# Patient Record
Sex: FEMALE | Race: OTHER | Employment: OTHER | URBAN - METROPOLITAN AREA
[De-identification: names, ages, dates, MRNs, and addresses within clinical notes are randomized per-mention and may not be internally consistent; named-entity substitution may affect disease eponyms.]

---

## 2017-09-25 ENCOUNTER — HOSPITAL ENCOUNTER (OUTPATIENT)
Dept: GENERAL RADIOLOGY | Age: 72
Discharge: HOME | End: 2017-09-25
Attending: INTERNAL MEDICINE
Payer: MEDICARE

## 2017-09-25 DIAGNOSIS — M05.79 RHEUMATOID ARTHRITIS INVOLVING MULTIPLE SITES WITH POSITIVE RHEUMATOID FACTOR (HCC): ICD-10-CM

## 2017-09-25 PROCEDURE — 73030 X-RAY EXAM OF SHOULDER: CPT | Performed by: INTERNAL MEDICINE

## 2017-09-25 PROCEDURE — 73080 X-RAY EXAM OF ELBOW: CPT | Performed by: INTERNAL MEDICINE

## 2018-01-18 ENCOUNTER — HOSPITAL ENCOUNTER (INPATIENT)
Facility: HOSPITAL | Age: 73
LOS: 2 days | Discharge: HOME | DRG: 103 | End: 2018-01-20
Attending: EMERGENCY MEDICINE | Admitting: INTERNAL MEDICINE
Payer: MEDICARE

## 2018-01-18 ENCOUNTER — APPOINTMENT (OUTPATIENT)
Dept: CT IMAGING | Age: 73
DRG: 103 | End: 2018-01-18
Attending: EMERGENCY MEDICINE
Payer: MEDICARE

## 2018-01-18 DIAGNOSIS — R51.9 INTRACTABLE HEADACHE, UNSPECIFIED CHRONICITY PATTERN, UNSPECIFIED HEADACHE TYPE: Primary | ICD-10-CM

## 2018-01-18 LAB
ALBUMIN SERPL-MCNC: 4.2 G/DL (ref 3.5–4.8)
ALP LIVER SERPL-CCNC: 130 U/L (ref 55–142)
ALT SERPL-CCNC: 26 U/L (ref 14–54)
AST SERPL-CCNC: 34 U/L (ref 15–41)
BASOPHILS # BLD AUTO: 0.02 X10(3) UL (ref 0–0.1)
BASOPHILS NFR BLD AUTO: 0.2 %
BILIRUB SERPL-MCNC: 0.5 MG/DL (ref 0.1–2)
BILIRUB UR QL STRIP.AUTO: NEGATIVE
BUN BLD-MCNC: 12 MG/DL (ref 8–20)
CALCIUM BLD-MCNC: 8.8 MG/DL (ref 8.3–10.3)
CHLORIDE: 103 MMOL/L (ref 101–111)
CLARITY CSF: CLEAR
CLARITY CSF: CLEAR
CLARITY UR REFRACT.AUTO: CLEAR
CO2: 28 MMOL/L (ref 22–32)
COLOR CSF: COLORLESS
COLOR CSF: COLORLESS
COLOR UR AUTO: YELLOW
COUNT PERFORMED ON TUBE: 1
COUNT PERFORMED ON TUBE: 4
CREAT BLD-MCNC: 0.77 MG/DL (ref 0.55–1.02)
EOSINOPHIL # BLD AUTO: 0.08 X10(3) UL (ref 0–0.3)
EOSINOPHIL NFR BLD AUTO: 0.9 %
ERYTHROCYTE [DISTWIDTH] IN BLOOD BY AUTOMATED COUNT: 12.8 % (ref 11.5–16)
GLUCOSE BLD-MCNC: 117 MG/DL (ref 70–99)
GLUCOSE CSF: 57 MG/DL (ref 40–70)
GLUCOSE UR STRIP.AUTO-MCNC: NEGATIVE MG/DL
HCT VFR BLD AUTO: 40.7 % (ref 34–50)
HGB BLD-MCNC: 14 G/DL (ref 12–16)
IMMATURE GRANULOCYTE COUNT: 0.03 X10(3) UL (ref 0–1)
IMMATURE GRANULOCYTE RATIO %: 0.3 %
KETONES UR STRIP.AUTO-MCNC: NEGATIVE MG/DL
LEUKOCYTE ESTERASE UR QL STRIP.AUTO: NEGATIVE
LIPASE: 82 U/L (ref 73–393)
LYMPHOCYTES # BLD AUTO: 1.56 X10(3) UL (ref 0.9–4)
LYMPHOCYTES NFR BLD AUTO: 17.4 %
M PROTEIN MFR SERPL ELPH: 7.8 G/DL (ref 6.1–8.3)
MCH RBC QN AUTO: 31 PG (ref 27–33.2)
MCHC RBC AUTO-ENTMCNC: 34.4 G/DL (ref 31–37)
MCV RBC AUTO: 90.2 FL (ref 81–100)
MONOCYTES # BLD AUTO: 0.3 X10(3) UL (ref 0.1–0.6)
MONOCYTES NFR BLD AUTO: 3.3 %
NEUTROPHIL ABS PRELIM: 7 X10 (3) UL (ref 1.3–6.7)
NEUTROPHILS # BLD AUTO: 7 X10(3) UL (ref 1.3–6.7)
NEUTROPHILS NFR BLD AUTO: 77.9 %
NITRITE UR QL STRIP.AUTO: NEGATIVE
PH UR STRIP.AUTO: 8.5 [PH] (ref 4.5–8)
PLATELET # BLD AUTO: 160 10(3)UL (ref 150–450)
POTASSIUM SERPL-SCNC: 4 MMOL/L (ref 3.6–5.1)
RBC # BLD AUTO: 4.51 X10(6)UL (ref 3.8–5.1)
RBC #/AREA URNS AUTO: >10 /HPF
RBC CSF TUBE 1: 43 /MM3
RBC CSF: 0 /MM3
RED CELL DISTRIBUTION WIDTH-SD: 42.6 FL (ref 35.1–46.3)
SODIUM SERPL-SCNC: 137 MMOL/L (ref 136–144)
SP GR UR STRIP.AUTO: 1.02 (ref 1–1.03)
TOTAL PROTEIN CSF: 30.8 MG/DL (ref 15–45)
TOTAL VOLUME CSF: 8.5 ML
UROBILINOGEN UR STRIP.AUTO-MCNC: 0.2 MG/DL
WBC # BLD AUTO: 9 X10(3) UL (ref 4–13)
WBC # FLD MANUAL: 0 /MM3 (ref 0–5)

## 2018-01-18 PROCEDURE — 70450 CT HEAD/BRAIN W/O DYE: CPT | Performed by: EMERGENCY MEDICINE

## 2018-01-18 PROCEDURE — 99223 1ST HOSP IP/OBS HIGH 75: CPT | Performed by: HOSPITALIST

## 2018-01-18 PROCEDURE — 70496 CT ANGIOGRAPHY HEAD: CPT | Performed by: EMERGENCY MEDICINE

## 2018-01-18 PROCEDURE — 009U3ZX DRAINAGE OF SPINAL CANAL, PERCUTANEOUS APPROACH, DIAGNOSTIC: ICD-10-PCS | Performed by: EMERGENCY MEDICINE

## 2018-01-18 RX ORDER — DIPHENHYDRAMINE HYDROCHLORIDE 50 MG/ML
25 INJECTION INTRAMUSCULAR; INTRAVENOUS EVERY 8 HOURS PRN
Status: DISCONTINUED | OUTPATIENT
Start: 2018-01-18 | End: 2018-01-20

## 2018-01-18 RX ORDER — ONDANSETRON 2 MG/ML
4 INJECTION INTRAMUSCULAR; INTRAVENOUS EVERY 6 HOURS PRN
Status: DISCONTINUED | OUTPATIENT
Start: 2018-01-18 | End: 2018-01-20

## 2018-01-18 RX ORDER — HYDROCODONE BITARTRATE AND ACETAMINOPHEN 5; 325 MG/1; MG/1
1 TABLET ORAL EVERY 4 HOURS PRN
Status: DISCONTINUED | OUTPATIENT
Start: 2018-01-18 | End: 2018-01-20

## 2018-01-18 RX ORDER — ONDANSETRON 2 MG/ML
INJECTION INTRAMUSCULAR; INTRAVENOUS
Status: COMPLETED
Start: 2018-01-18 | End: 2018-01-18

## 2018-01-18 RX ORDER — ALPRAZOLAM 0.5 MG/1
0.5 TABLET ORAL NIGHTLY
COMMUNITY

## 2018-01-18 RX ORDER — SODIUM CHLORIDE 9 MG/ML
INJECTION, SOLUTION INTRAVENOUS CONTINUOUS
Status: DISCONTINUED | OUTPATIENT
Start: 2018-01-18 | End: 2018-01-20

## 2018-01-18 RX ORDER — HYDROMORPHONE HYDROCHLORIDE 1 MG/ML
1 INJECTION, SOLUTION INTRAMUSCULAR; INTRAVENOUS; SUBCUTANEOUS EVERY 2 HOUR PRN
Status: DISCONTINUED | OUTPATIENT
Start: 2018-01-18 | End: 2018-01-20

## 2018-01-18 RX ORDER — ENOXAPARIN SODIUM 100 MG/ML
40 INJECTION SUBCUTANEOUS DAILY
Status: DISCONTINUED | OUTPATIENT
Start: 2018-01-19 | End: 2018-01-20

## 2018-01-18 RX ORDER — ACETAMINOPHEN 325 MG/1
650 TABLET ORAL EVERY 4 HOURS PRN
Status: DISCONTINUED | OUTPATIENT
Start: 2018-01-18 | End: 2018-01-20

## 2018-01-18 RX ORDER — LEVOTHYROXINE SODIUM 0.12 MG/1
125 TABLET ORAL
Status: DISCONTINUED | OUTPATIENT
Start: 2018-01-19 | End: 2018-01-20

## 2018-01-18 RX ORDER — SODIUM CHLORIDE 9 MG/ML
INJECTION, SOLUTION INTRAVENOUS ONCE
Status: COMPLETED | OUTPATIENT
Start: 2018-01-18 | End: 2018-01-18

## 2018-01-18 RX ORDER — ONDANSETRON 2 MG/ML
4 INJECTION INTRAMUSCULAR; INTRAVENOUS ONCE
Status: COMPLETED | OUTPATIENT
Start: 2018-01-18 | End: 2018-01-18

## 2018-01-18 RX ORDER — PANTOPRAZOLE SODIUM 20 MG/1
20 TABLET, DELAYED RELEASE ORAL
Status: DISCONTINUED | OUTPATIENT
Start: 2018-01-19 | End: 2018-01-20

## 2018-01-18 RX ORDER — HYDROMORPHONE HYDROCHLORIDE 1 MG/ML
1 INJECTION, SOLUTION INTRAMUSCULAR; INTRAVENOUS; SUBCUTANEOUS EVERY 30 MIN PRN
Status: DISCONTINUED | OUTPATIENT
Start: 2018-01-18 | End: 2018-01-18

## 2018-01-18 RX ORDER — HYDROCODONE BITARTRATE AND ACETAMINOPHEN 5; 325 MG/1; MG/1
2 TABLET ORAL EVERY 4 HOURS PRN
Status: DISCONTINUED | OUTPATIENT
Start: 2018-01-18 | End: 2018-01-20

## 2018-01-19 PROCEDURE — 99232 SBSQ HOSP IP/OBS MODERATE 35: CPT | Performed by: HOSPITALIST

## 2018-01-19 PROCEDURE — 99223 1ST HOSP IP/OBS HIGH 75: CPT | Performed by: OTHER

## 2018-01-19 RX ORDER — DEXAMETHASONE SODIUM PHOSPHATE 4 MG/ML
4 VIAL (ML) INJECTION EVERY 6 HOURS
Status: COMPLETED | OUTPATIENT
Start: 2018-01-19 | End: 2018-01-19

## 2018-01-19 RX ORDER — ALPRAZOLAM 0.5 MG/1
0.5 TABLET ORAL NIGHTLY
Status: DISCONTINUED | OUTPATIENT
Start: 2018-01-19 | End: 2018-01-20

## 2018-01-19 RX ORDER — LORAZEPAM 2 MG/ML
INJECTION INTRAMUSCULAR
Status: COMPLETED
Start: 2018-01-19 | End: 2018-01-19

## 2018-01-19 RX ORDER — LORAZEPAM 2 MG/ML
2 INJECTION INTRAMUSCULAR ONCE
Status: COMPLETED | OUTPATIENT
Start: 2018-01-19 | End: 2018-01-19

## 2018-01-19 RX ORDER — CALCIUM CARBONATE 200(500)MG
500 TABLET,CHEWABLE ORAL 3 TIMES DAILY PRN
Status: DISCONTINUED | OUTPATIENT
Start: 2018-01-19 | End: 2018-01-20

## 2018-01-19 RX ORDER — DEXAMETHASONE SODIUM PHOSPHATE 4 MG/ML
4 VIAL (ML) INJECTION EVERY 6 HOURS
Status: DISCONTINUED | OUTPATIENT
Start: 2018-01-19 | End: 2018-01-20

## 2018-01-19 RX ORDER — DEXAMETHASONE SODIUM PHOSPHATE 4 MG/ML
4 VIAL (ML) INJECTION EVERY 8 HOURS
Status: DISCONTINUED | OUTPATIENT
Start: 2018-01-20 | End: 2018-01-20

## 2018-01-19 RX ORDER — ONDANSETRON 2 MG/ML
4 INJECTION INTRAMUSCULAR; INTRAVENOUS EVERY 8 HOURS
Status: DISCONTINUED | OUTPATIENT
Start: 2018-01-20 | End: 2018-01-20

## 2018-01-19 RX ORDER — ONDANSETRON 2 MG/ML
4 INJECTION INTRAMUSCULAR; INTRAVENOUS ONCE
Status: COMPLETED | OUTPATIENT
Start: 2018-01-19 | End: 2018-01-19

## 2018-01-19 NOTE — CM/SW NOTE
.     01/19/18 1400   CM/SW Screening   Referral [facility] staff; Chart review;Nursing rounds   Patient's Mental Status Alert;Oriented   Patient lives with (Daughter)   Patient Status Prior to Admission   Independent with

## 2018-01-19 NOTE — ED NOTES
Pt vomited during triage. States nausea is better at the moment. Does not want antiemetic. Told PT the order was standing and to alert nurses station if nausea returns so medication can be given. PT and daughter verbalized understanding of instructions.

## 2018-01-19 NOTE — PLAN OF CARE
Patient/Family Goals    • Patient/Family Long Term Goal Progressing    • Patient/Family Short Term Goal Progressing          Problem: Intractable headache    Data: Pt alert and oriented x4.  C/o headache/nausea-prn iv benadryl/zofran/dilaudid given with rel

## 2018-01-19 NOTE — PROGRESS NOTES
[facility]OSPITAL[facility]  Progress Note     Miles Raw Patient Status:  Inpatient    1945 MRN [id number]   [facility] 5NW-A Attending Aldair Buck MD   Hosp Day # 1 PCP Michelle Durand MD     Chief Complaint: headache    S: Patient feels patient  3. Cont pain control-much better today  4. Neurology  eval pending d/c  2. Hypothyroid, Synthroid  3.  RA-stable hands deformities        Quality:  · DVT Prophylaxis: lovenox  · CODE status: Full  · Kline: no     Plan of care discussed with patient

## 2018-01-19 NOTE — PROGRESS NOTES
NURSING ADMISSION NOTE      Patient admitted via Ambulance  Oriented to room. Safety precautions initiated. Bed in low position. Call light in reach.     Navigator completed  Dr. Ap Gross paged for IV pain meds since pt is nauseous/vomiting  Dr. Kat Morris

## 2018-01-19 NOTE — CONSULTS
[facility]  Report of Consultation    Mounika Murrell Patient Status:  Inpatient    1945 MRN [id number]   [facility] 5NW-A Attending Barak Connors MD   Hosp Day # 1 PCP Sugar Doe MD     Reason for Consultation:  Intractable hea HISTORY      Comment: Knuckle replacement  1996: OTHER SURGICAL HISTORY      Comment: Cervical fusion C-1, C-2  Family History   Problem Relation Age of Onset   • Heart Disease Mother    • Hypertension Mother    • Heart Disease Father    • Hypertension Fat **OR** HYDROcodone-acetaminophen (NORCO) 5-325 MG per tab 2 tablet, 2 tablet, Oral, Q4H PRN  •  ondansetron HCl (ZOFRAN) injection 4 mg, 4 mg, Intravenous, Q6H PRN  •  HYDROmorphone HCl PF (DILAUDID) 1 MG/ML injection 1 mg, 1 mg, Intravenous, Q2H PRN  •  V  01/18/2018   K 4.0 01/18/2018    01/18/2018   CO2 28.0 01/18/2018    01/18/2018   CA 8.8 01/18/2018   ALB 4.2 01/18/2018   ALKPHO 130 01/18/2018   BILT 0.5 01/18/2018   TP 7.8 01/18/2018   AST 34 01/18/2018   ALT 26 01/18/2018   LIP 8

## 2018-01-19 NOTE — ED NOTES
Pt transferred to EMS stretcher, remains flat. Pt remains stable. Comfortable with admission plan of care.

## 2018-01-19 NOTE — ED PROVIDER NOTES
Patient Seen in: THE [facility] Emergency Department In [place]    History   Patient presents with:  Nausea/Vomiting/Diarrhea (gastrointestinal)  Headache (neurologic)    Stated Complaint: headache and vomiting since this am    HPI    Patient is a 77-year-old systems are as noted in HPI. Constitutional and vital signs reviewed. All other systems reviewed and negative except as noted above.     Physical Exam   ED Triage Vitals [01/18/18 1834]  BP: 153/93  Pulse: 96  Resp: 20  Temp: 98.1 °F (36.7 °C)  Temp s following:     RBC URINE >10 (*)     Bacteria Urine Rare (*)     All other components within normal limits   CBC W/ DIFFERENTIAL - Abnormal; Notable for the following:     Neutrophil Absolute Prelim 7.00 (*)     Neutrophil Absolute 7.00 (*)     All other c FINDINGS:  Again noted is a large metallic plate involving the right parietal bone.   This causes extensive metallic artifact which obscures large portions of the right cerebrum, right basal ganglia, upper mid brain and lesser portions of the left cerebrum clear CSF was removed from the subarachnoid space and the L4-L5 space with one attempt, hemostasis was achieved and she was placed in the supine position for 1 hour given IV fluids.       After the LP, the CTA of her brain was obtained, and she was transfer

## 2018-01-19 NOTE — ED INITIAL ASSESSMENT (HPI)
Pt to the ED for evaluation of headache and nausea that started this morning. PT reports she had one episode of emesis today at 1530 and has been nauseated ever since.

## 2018-01-19 NOTE — H&P
[facility] H[facility]PITAL[facility]  History and Physical     Idalia Gil Patient Status:  Inpatient    1945 MRN [id number]   [facility] 5NW-A Attending Michelle Christopher MD   Hosp Day # 0 PCP Tulio Lainez MD     Chief Complaint: Headache    His Family History   Problem Relation Age of Onset   • Heart Disease Mother    • Hypertension Mother    • Heart Disease Father    • Hypertension Father    • Cancer Brother      Throat Cancer   • Cancer Brother      Esophageal Cancer   • Diabetes Brother    • edema or cyanosis. Integument: No rashes or lesions. Psychiatric: Appropriate mood and affect.       Diagnostic Data:      Labs:  Recent Labs   Lab  01/18/18   1836   WBC  9.0   HGB  14.0   MCV  90.2   PLT  160.0       Recent Labs   Lab  01/18/18   1836

## 2018-01-20 VITALS
BODY MASS INDEX: 27.88 KG/M2 | OXYGEN SATURATION: 92 % | SYSTOLIC BLOOD PRESSURE: 149 MMHG | TEMPERATURE: 98 F | HEIGHT: 60 IN | HEART RATE: 64 BPM | WEIGHT: 142 LBS | RESPIRATION RATE: 20 BRPM | DIASTOLIC BLOOD PRESSURE: 60 MMHG

## 2018-01-20 PROCEDURE — 99233 SBSQ HOSP IP/OBS HIGH 50: CPT | Performed by: OTHER

## 2018-01-20 PROCEDURE — 99238 HOSP IP/OBS DSCHRG MGMT 30/<: CPT | Performed by: HOSPITALIST

## 2018-01-20 RX ORDER — METHYLPREDNISOLONE 4 MG/1
TABLET ORAL
Qty: 1 PACKAGE | Refills: 0 | Status: SHIPPED | OUTPATIENT
Start: 2018-01-20 | End: 2020-01-24

## 2018-01-20 RX ORDER — SUMATRIPTAN 50 MG/1
50 TABLET, FILM COATED ORAL EVERY 2 HOUR PRN
Qty: 9 TABLET | Refills: 0 | Status: SHIPPED | OUTPATIENT
Start: 2018-01-20 | End: 2018-02-07

## 2018-01-20 NOTE — PLAN OF CARE
Patient/Family Goals    • Patient/Family Long Term Goal Progressing    • Patient/Family Short Term Goal Progressing        Pt is alert and oriented. O2 98% on Ra. Pt C/o pain in neck at 6/10. Pt states nausea has gotten better.   Administering meds per M

## 2018-01-20 NOTE — PROGRESS NOTES
[id number] [facility] Progress Note    Kevin Andrade Patient Status:  Inpatient    1945 MRN [id number]   [facility] 5NW-A Attending Arlene Holt MD   Hosp Day # 2 PCP Sally Amaro MD     Subjective:  Kevin Andrade is a(n) 7 migraine  - in addition, will send home on medrol dose pack     Can follow up in clinic with me in ~ 4 weeks for migraine management long term       Enedina Barragan MD, Neurology  [facility]  Pager [phone]  1/20/2018

## 2018-01-20 NOTE — PROGRESS NOTES
PROBLEM: Headache    ASSESSMENT: Assumed care of pt at 1500. Pt is A&O x4. VSS, afebrile. Denies any dizziness or photosensitivity; states that headache has improved overall. Scheduled medications given per neurology order. On RA, denies SOB.  Eating dinner

## 2018-01-25 NOTE — DISCHARGE SUMMARY
[facility]OSPITAL[facility]  DISCHARGE SUMMARY     Ivelisse Monge Patient Status:  Inpatient    1945 MRN [id number]   [facility] 5NW-A Attending No att. providers found   Hosp Day # 2 PCP Meghana Brady MD     Date of Admission: 2018  Date taking on:  2/19/2018  Quantity:  9 tablet  Refills:  0        CONTINUE taking these medications      Instructions Prescription details   ACTEMRA SC      Inject into the skin. Refills:  0     ALPRAZolam 0.5 MG Tabs  Commonly known as:  XANAX      Take 0.

## 2018-02-07 RX ORDER — SUMATRIPTAN 50 MG/1
TABLET, FILM COATED ORAL
Qty: 9 TABLET | Refills: 0 | Status: SHIPPED | OUTPATIENT
Start: 2018-02-07

## 2018-02-07 NOTE — TELEPHONE ENCOUNTER
Medication:Sumatriptan 50 mg    Date of last refill: 01/20/18  [place]     Last office visit: 1/20/17(Hospital)  Due back to clinic per last office note:  RTN in 4 weeks  Date next office visit scheduled:  No future appo

## 2018-10-27 ENCOUNTER — HOSPITAL ENCOUNTER (EMERGENCY)
Age: 73
Discharge: HOME | End: 2018-10-28
Attending: EMERGENCY MEDICINE
Payer: MEDICARE

## 2018-10-27 DIAGNOSIS — R31.9 HEMATURIA, UNSPECIFIED TYPE: ICD-10-CM

## 2018-10-27 DIAGNOSIS — D69.6 THROMBOCYTOPENIA (HCC): ICD-10-CM

## 2018-10-27 DIAGNOSIS — R53.81 MALAISE: Primary | ICD-10-CM

## 2018-10-27 PROCEDURE — 99284 EMERGENCY DEPT VISIT MOD MDM: CPT

## 2018-10-27 PROCEDURE — 85025 COMPLETE CBC W/AUTO DIFF WBC: CPT | Performed by: EMERGENCY MEDICINE

## 2018-10-27 PROCEDURE — 36415 COLL VENOUS BLD VENIPUNCTURE: CPT

## 2018-10-27 PROCEDURE — 99285 EMERGENCY DEPT VISIT HI MDM: CPT

## 2018-10-27 PROCEDURE — 80053 COMPREHEN METABOLIC PANEL: CPT | Performed by: EMERGENCY MEDICINE

## 2018-10-27 PROCEDURE — 93005 ELECTROCARDIOGRAM TRACING: CPT

## 2018-10-27 PROCEDURE — 84484 ASSAY OF TROPONIN QUANT: CPT | Performed by: EMERGENCY MEDICINE

## 2018-10-27 PROCEDURE — 93010 ELECTROCARDIOGRAM REPORT: CPT

## 2018-10-28 VITALS
RESPIRATION RATE: 16 BRPM | WEIGHT: 140 LBS | OXYGEN SATURATION: 97 % | TEMPERATURE: 99 F | DIASTOLIC BLOOD PRESSURE: 58 MMHG | SYSTOLIC BLOOD PRESSURE: 168 MMHG | HEART RATE: 71 BPM | BODY MASS INDEX: 27 KG/M2

## 2018-10-28 PROCEDURE — 81001 URINALYSIS AUTO W/SCOPE: CPT | Performed by: EMERGENCY MEDICINE

## 2018-10-28 PROCEDURE — 87086 URINE CULTURE/COLONY COUNT: CPT | Performed by: EMERGENCY MEDICINE

## 2018-10-28 NOTE — ED PROVIDER NOTES
Patient Seen in: [facility] In [place]    History   Patient presents with:  Hypertension (cardiovascular)    Stated Complaint: htn, headache, nausea, fatigue    HPI    This is a very pleasant 15-year-old female with past medical histo Temp 98.5 °F (36.9 °C) (Temporal)   Resp 16   Wt 63.5 kg   SpO2 97%   BMI 27.34 kg/m²         Physical Exam    GENERAL: Awake, alert oriented x3, nontoxic appearing. SKIN: Normal, warm, and dry. HEENT:  Pupils equally round and reactive to light.  Conjuc Report. Rate:69  Rhythm: Sinus Rhythm  Reading: No acute changes                    MDM     Patient had a very difficult IV access. We are unable to obtain IV access and she declined any further attempts. Basic labs were obtained.   CBC: White blood cell

## 2018-10-28 NOTE — ED INITIAL ASSESSMENT (HPI)
Pt states she has not been feeling well all week with nausea, lightheadedness, fatigue and headache. Today pt checked BP at home and was very elevated with no hx of htn.

## 2020-01-24 ENCOUNTER — APPOINTMENT (OUTPATIENT)
Dept: ULTRASOUND IMAGING | Age: 75
End: 2020-01-24
Attending: EMERGENCY MEDICINE
Payer: MEDICARE

## 2020-01-24 ENCOUNTER — HOSPITAL ENCOUNTER (EMERGENCY)
Age: 75
Discharge: HOME | End: 2020-01-24
Attending: EMERGENCY MEDICINE
Payer: MEDICARE

## 2020-01-24 VITALS
OXYGEN SATURATION: 98 % | RESPIRATION RATE: 18 BRPM | DIASTOLIC BLOOD PRESSURE: 77 MMHG | BODY MASS INDEX: 27 KG/M2 | TEMPERATURE: 98 F | HEART RATE: 78 BPM | WEIGHT: 140 LBS | SYSTOLIC BLOOD PRESSURE: 180 MMHG

## 2020-01-24 DIAGNOSIS — M79.662 PAIN OF LEFT CALF: Primary | ICD-10-CM

## 2020-01-24 DIAGNOSIS — M77.8 RIGHT ELBOW TENDINITIS: ICD-10-CM

## 2020-01-24 PROCEDURE — 99284 EMERGENCY DEPT VISIT MOD MDM: CPT

## 2020-01-24 PROCEDURE — 93971 EXTREMITY STUDY: CPT | Performed by: EMERGENCY MEDICINE

## 2020-01-24 NOTE — ED PROVIDER NOTES
Patient Seen in: [facility] In [place]      History   Patient presents with:  Deep Vein Thrombosis  Lower Extremity Injury  Upper Extremity Injury    Stated Complaint: LEFT CALF PAIN SINCE LAST NOC.   SINCE 01/01/20 WITH RIGHT ELBOW PA use: No             Review of Systems    Positive for stated complaint: LEFT CALF PAIN SINCE LAST NOC. SINCE 01/01/20 WITH RIGHT ELBOW PAIN, HAS HAD X*  Other systems are as noted in HPI. Constitutional and vital signs reviewed.       All other systems re EXAMINED:  Left lower extremity SAPHENOFEMORAL JUNCTION:  No reflux. THROMBI:  None visible. COMPRESSION:  Normal compressibility, phasicity, and augmentation. OTHER:  Negative. CONCLUSION:  No evidence of left lower extremity DVT.     Dictated by: Chirag Cornell

## 2020-01-24 NOTE — ED INITIAL ASSESSMENT (HPI)
REPORTS LEFT CALF PAIN SINCE LAST NOC AND IS CONCERNED OF DVT. GOT SHOCKED WITH TIEN LIGHTS ON 01/01/20 AND HAS RIGHT ARM PAIN. HAS CONTINUED RIGHT ARM PAIN.   WAS INSTRUCTED TO GET THERAPY ON HER ARM BUT \"I DONT KNOW WHAT IS WRONG SO I AM NOT GOING

## 2020-01-26 ENCOUNTER — APPOINTMENT (OUTPATIENT)
Dept: GENERAL RADIOLOGY | Age: 75
End: 2020-01-26
Attending: EMERGENCY MEDICINE
Payer: MEDICARE

## 2020-01-26 ENCOUNTER — HOSPITAL ENCOUNTER (EMERGENCY)
Age: 75
Discharge: HOME | End: 2020-01-26
Attending: EMERGENCY MEDICINE
Payer: MEDICARE

## 2020-01-26 VITALS
HEART RATE: 71 BPM | BODY MASS INDEX: 27.48 KG/M2 | HEIGHT: 60 IN | OXYGEN SATURATION: 99 % | DIASTOLIC BLOOD PRESSURE: 68 MMHG | TEMPERATURE: 98 F | RESPIRATION RATE: 18 BRPM | SYSTOLIC BLOOD PRESSURE: 198 MMHG | WEIGHT: 140 LBS

## 2020-01-26 DIAGNOSIS — S20.212A CONTUSION OF LEFT CHEST WALL, INITIAL ENCOUNTER: Primary | ICD-10-CM

## 2020-01-26 PROCEDURE — 71101 X-RAY EXAM UNILAT RIBS/CHEST: CPT | Performed by: EMERGENCY MEDICINE

## 2020-01-26 PROCEDURE — 99283 EMERGENCY DEPT VISIT LOW MDM: CPT

## 2020-01-26 NOTE — ED PROVIDER NOTES
Patient Seen in: [facility] Emergency Department In [place]      History   Patient presents with:  Fall  Trauma    Stated Complaint: Pt fell Friday evening (sts she tripped on rug), c/o left rib pain.  Not on bloo*    HPI    73-year-old female presents ashley (36.7 °C)   Temp src Temporal   SpO2 99 %   O2 Device        Current:BP (!) 198/68   Pulse 71   Temp 98.1 °F (36.7 °C) (Temporal)   Resp 18   Ht 152.4 cm (5')   Wt 63.5 kg   SpO2 99%   BMI 27.34 kg/m²         Physical Exam    GENERAL: Patient is awake, felicitas Prescribed:  Current Discharge Medication List

## 2020-01-26 NOTE — ED INITIAL ASSESSMENT (HPI)
Pt sts she tripped and fell on run Friday evening. Sts she fell on her front onto a metal stool. C/O left sided rib pain and arm pain. Also c/o right toe pain. Was seen here Friday am to r/o DVT and d/c home. Denies head injury, not on any blood thinners.

## 2020-01-30 ENCOUNTER — HOSPITAL ENCOUNTER (EMERGENCY)
Age: 75
Discharge: HOME | End: 2020-01-30
Attending: EMERGENCY MEDICINE
Payer: MEDICARE

## 2020-01-30 ENCOUNTER — APPOINTMENT (OUTPATIENT)
Dept: GENERAL RADIOLOGY | Age: 75
End: 2020-01-30
Attending: NURSE PRACTITIONER
Payer: MEDICARE

## 2020-01-30 VITALS
DIASTOLIC BLOOD PRESSURE: 74 MMHG | HEART RATE: 84 BPM | WEIGHT: 140 LBS | TEMPERATURE: 99 F | BODY MASS INDEX: 27 KG/M2 | OXYGEN SATURATION: 98 % | SYSTOLIC BLOOD PRESSURE: 207 MMHG | RESPIRATION RATE: 20 BRPM

## 2020-01-30 DIAGNOSIS — S22.31XA CLOSED FRACTURE OF ONE RIB OF RIGHT SIDE, INITIAL ENCOUNTER: Primary | ICD-10-CM

## 2020-01-30 PROCEDURE — 71101 X-RAY EXAM UNILAT RIBS/CHEST: CPT | Performed by: NURSE PRACTITIONER

## 2020-01-30 PROCEDURE — 99283 EMERGENCY DEPT VISIT LOW MDM: CPT

## 2020-01-30 NOTE — ED INITIAL ASSESSMENT (HPI)
Pt states that she fell on Friday into a bar stool and was seen here for her injury. Pt states that last night she was rolling in her bed, and heard a \"pop\" in her RIGHT side of the chest. Pt states that she has pain with movement.

## 2020-01-31 NOTE — ED PROVIDER NOTES
Patient Seen in: THE [facility] Emergency Department In [place]      History   Patient presents with:  Trauma    Stated Complaint: right side chest pain:\"felt a pop\"    HPI  Patient is a 57-year-old female past medical history of osteoporosis, hypothyroidis ED Triage Vitals [01/30/20 0146]   BP (!) 201/62   Pulse 84   Resp 16   Temp 98.9 °F (37.2 °C)   Temp src Temporal   SpO2 98 %   O2 Device None (Room air)       Current:BP (!) 207/74   Pulse 84   Temp 98.9 °F (37.2 °C) (Temporal)   Resp 20   Wt 63.5 kg   S PROCEDURE:  US VENOUS DOPPLER LEG LEFT - DIAG IMG (CPT=93971)  COMPARISON:  None. INDICATIONS:  LEFT CALF PAIN SINCE LAST NOC.   SINCE 01/01/20 WITH RIGHT ELBOW PAIN, HAS HAD X-RAY ON IT BUT IT STILL HURTS  TECHNIQUE:  Real time, grey scale, and duplex ult PROCEDURE:  XR RIBS WITH CHEST (3 VIEWS), LEFT  (CPT=71101)  TECHNIQUE:  PA Chest and three views of the ribs were obtained  COMPARISON:  [facility], XR, CHEST PA   LATERAL, 2/02/2012, 14:03.   INDICATIONS:  Pt fell Friday evening (sts she tripped on rug), CONCLUSION:  No acute right-sided rib fractures. No acute pulmonary findings.     Dictated by: Dasia Day MD on 1/30/2020 at 18:41     Approved by: Dasia Day MD on 1/30/2020 at 18:42            MDM     Right rib x-ray/chest x-ray-no acute ri

## 2023-01-23 PROBLEM — H35.30 ARMD (AGE RELATED MACULAR DEGENERATION): Status: ACTIVE | Noted: 2022-09-06

## 2023-01-23 PROBLEM — H54.3 DECREASED VISION IN BOTH EYES: Status: ACTIVE | Noted: 2022-09-06

## 2023-01-23 PROBLEM — H40.1234 BILATERAL LOW-TENSION GLAUCOMA, INDETERMINATE STAGE: Status: ACTIVE | Noted: 2018-04-04

## 2023-01-23 PROBLEM — M81.8 OTHER OSTEOPOROSIS WITHOUT CURRENT PATHOLOGICAL FRACTURE: Status: ACTIVE | Noted: 2022-11-01

## 2023-02-18 ENCOUNTER — HOSPITAL ENCOUNTER (EMERGENCY)
Age: 78
Discharge: HOME | End: 2023-02-18
Attending: EMERGENCY MEDICINE
Payer: MEDICARE

## 2023-02-18 VITALS
HEART RATE: 85 BPM | HEIGHT: 60 IN | TEMPERATURE: 99 F | BODY MASS INDEX: 28.07 KG/M2 | DIASTOLIC BLOOD PRESSURE: 89 MMHG | OXYGEN SATURATION: 96 % | WEIGHT: 143 LBS | RESPIRATION RATE: 17 BRPM | SYSTOLIC BLOOD PRESSURE: 190 MMHG

## 2023-02-18 DIAGNOSIS — U07.1 COVID-19: Primary | ICD-10-CM

## 2023-02-18 LAB — SARS-COV-2 RNA RESP QL NAA+PROBE: DETECTED

## 2023-02-18 PROCEDURE — 99283 EMERGENCY DEPT VISIT LOW MDM: CPT

## 2023-02-18 PROCEDURE — 99284 EMERGENCY DEPT VISIT MOD MDM: CPT

## 2023-02-18 RX ORDER — NIRMATRELVIR AND RITONAVIR 300-100 MG
KIT ORAL
Qty: 30 TABLET | Refills: 0 | Status: SHIPPED | OUTPATIENT
Start: 2023-02-18 | End: 2023-02-23

## 2023-02-18 RX ORDER — ONDANSETRON 4 MG/1
4 TABLET, ORALLY DISINTEGRATING ORAL EVERY 4 HOURS PRN
Qty: 10 TABLET | Refills: 0 | Status: SHIPPED | OUTPATIENT
Start: 2023-02-18 | End: 2023-02-25

## 2023-02-18 NOTE — ED INITIAL ASSESSMENT (HPI)
Pt states her daughter is covid positive and she now has the same symptoms. Pt reports cough and sore throat. Pt denies any fevers.

## 2023-06-23 ENCOUNTER — HOSPITAL ENCOUNTER (OUTPATIENT)
Dept: CT IMAGING | Age: 78
Discharge: HOME | End: 2023-06-23
Attending: FAMILY MEDICINE
Payer: MEDICARE

## 2023-06-23 DIAGNOSIS — R14.0 BLOATING: ICD-10-CM

## 2023-06-23 DIAGNOSIS — Z80.0 FAMILY HISTORY OF MALIGNANT NEOPLASM OF GASTROINTESTINAL TRACT: ICD-10-CM

## 2023-06-23 DIAGNOSIS — K21.00 GASTROESOPHAGEAL REFLUX DISEASE WITH ESOPHAGITIS WITHOUT HEMORRHAGE: ICD-10-CM

## 2023-06-23 DIAGNOSIS — R10.32 LEFT LOWER QUADRANT ABDOMINAL PAIN: ICD-10-CM

## 2023-06-23 PROCEDURE — 74177 CT ABD & PELVIS W/CONTRAST: CPT | Performed by: FAMILY MEDICINE

## 2023-07-10 ENCOUNTER — HOSPITAL ENCOUNTER (EMERGENCY)
Age: 78
Discharge: HOME | End: 2023-07-10
Attending: EMERGENCY MEDICINE
Payer: MEDICARE

## 2023-07-10 ENCOUNTER — APPOINTMENT (OUTPATIENT)
Dept: GENERAL RADIOLOGY | Age: 78
End: 2023-07-10
Attending: EMERGENCY MEDICINE
Payer: MEDICARE

## 2023-07-10 VITALS
WEIGHT: 140 LBS | OXYGEN SATURATION: 97 % | BODY MASS INDEX: 27.48 KG/M2 | RESPIRATION RATE: 16 BRPM | TEMPERATURE: 98 F | HEIGHT: 60 IN | SYSTOLIC BLOOD PRESSURE: 169 MMHG | HEART RATE: 67 BPM | DIASTOLIC BLOOD PRESSURE: 60 MMHG

## 2023-07-10 DIAGNOSIS — I49.1 PAC (PREMATURE ATRIAL CONTRACTION): ICD-10-CM

## 2023-07-10 DIAGNOSIS — R00.2 PALPITATIONS: Primary | ICD-10-CM

## 2023-07-10 LAB
ALBUMIN SERPL-MCNC: 4.1 G/DL (ref 3.4–5)
ALBUMIN/GLOB SERPL: 1.2 {RATIO} (ref 1–2)
ALP LIVER SERPL-CCNC: 119 U/L
ALT SERPL-CCNC: 33 U/L
ANION GAP SERPL CALC-SCNC: 2 MMOL/L (ref 0–18)
AST SERPL-CCNC: 29 U/L (ref 15–37)
ATRIAL RATE: 66 BPM
ATRIAL RATE: 81 BPM
BASOPHILS # BLD AUTO: 0.03 X10(3) UL (ref 0–0.2)
BASOPHILS NFR BLD AUTO: 0.4 %
BILIRUB SERPL-MCNC: 0.4 MG/DL (ref 0.1–2)
BUN BLD-MCNC: 16 MG/DL (ref 7–18)
CALCIUM BLD-MCNC: 9.1 MG/DL (ref 8.5–10.1)
CHLORIDE SERPL-SCNC: 106 MMOL/L (ref 98–112)
CO2 SERPL-SCNC: 29 MMOL/L (ref 21–32)
CREAT BLD-MCNC: 1.08 MG/DL
EOSINOPHIL # BLD AUTO: 0.23 X10(3) UL (ref 0–0.7)
EOSINOPHIL NFR BLD AUTO: 3.2 %
ERYTHROCYTE [DISTWIDTH] IN BLOOD BY AUTOMATED COUNT: 13.4 %
GFR SERPLBLD BASED ON 1.73 SQ M-ARVRAT: 52 ML/MIN/1.73M2 (ref 60–?)
GLOBULIN PLAS-MCNC: 3.5 G/DL (ref 2.8–4.4)
GLUCOSE BLD-MCNC: 103 MG/DL (ref 70–99)
HCT VFR BLD AUTO: 43.5 %
HGB BLD-MCNC: 14.3 G/DL
IMM GRANULOCYTES # BLD AUTO: 0.02 X10(3) UL (ref 0–1)
IMM GRANULOCYTES NFR BLD: 0.3 %
LYMPHOCYTES # BLD AUTO: 2.59 X10(3) UL (ref 1–4)
LYMPHOCYTES NFR BLD AUTO: 36.3 %
MAGNESIUM SERPL-MCNC: 2.1 MG/DL (ref 1.6–2.6)
MCH RBC QN AUTO: 30.5 PG (ref 26–34)
MCHC RBC AUTO-ENTMCNC: 32.9 G/DL (ref 31–37)
MCV RBC AUTO: 92.8 FL
MONOCYTES # BLD AUTO: 0.45 X10(3) UL (ref 0.1–1)
MONOCYTES NFR BLD AUTO: 6.3 %
NEUTROPHILS # BLD AUTO: 3.81 X10 (3) UL (ref 1.5–7.7)
NEUTROPHILS # BLD AUTO: 3.81 X10(3) UL (ref 1.5–7.7)
NEUTROPHILS NFR BLD AUTO: 53.5 %
OSMOLALITY SERPL CALC.SUM OF ELEC: 285 MOSM/KG (ref 275–295)
P AXIS: 44 DEGREES
P AXIS: 58 DEGREES
P-R INTERVAL: 214 MS
P-R INTERVAL: 222 MS
PLATELET # BLD AUTO: 149 10(3)UL (ref 150–450)
POTASSIUM SERPL-SCNC: 4 MMOL/L (ref 3.5–5.1)
PROT SERPL-MCNC: 7.6 G/DL (ref 6.4–8.2)
Q-T INTERVAL: 348 MS
Q-T INTERVAL: 386 MS
QRS DURATION: 76 MS
QRS DURATION: 80 MS
QTC CALCULATION (BEZET): 404 MS
QTC CALCULATION (BEZET): 404 MS
R AXIS: 2 DEGREES
R AXIS: 7 DEGREES
RBC # BLD AUTO: 4.69 X10(6)UL
SODIUM SERPL-SCNC: 137 MMOL/L (ref 136–145)
T AXIS: 37 DEGREES
T AXIS: 69 DEGREES
TROPONIN I HIGH SENSITIVITY: 7 NG/L
TROPONIN I HIGH SENSITIVITY: 9 NG/L
VENTRICULAR RATE: 66 BPM
VENTRICULAR RATE: 81 BPM
WBC # BLD AUTO: 7.1 X10(3) UL (ref 4–11)

## 2023-07-10 PROCEDURE — 80053 COMPREHEN METABOLIC PANEL: CPT

## 2023-07-10 PROCEDURE — 85025 COMPLETE CBC W/AUTO DIFF WBC: CPT | Performed by: EMERGENCY MEDICINE

## 2023-07-10 PROCEDURE — 99284 EMERGENCY DEPT VISIT MOD MDM: CPT

## 2023-07-10 PROCEDURE — 93010 ELECTROCARDIOGRAM REPORT: CPT

## 2023-07-10 PROCEDURE — 36415 COLL VENOUS BLD VENIPUNCTURE: CPT

## 2023-07-10 PROCEDURE — 83735 ASSAY OF MAGNESIUM: CPT | Performed by: EMERGENCY MEDICINE

## 2023-07-10 PROCEDURE — 84484 ASSAY OF TROPONIN QUANT: CPT | Performed by: EMERGENCY MEDICINE

## 2023-07-10 PROCEDURE — 93005 ELECTROCARDIOGRAM TRACING: CPT

## 2023-07-10 PROCEDURE — 99285 EMERGENCY DEPT VISIT HI MDM: CPT

## 2023-07-10 PROCEDURE — 71045 X-RAY EXAM CHEST 1 VIEW: CPT | Performed by: EMERGENCY MEDICINE

## 2023-07-10 PROCEDURE — 80053 COMPREHEN METABOLIC PANEL: CPT | Performed by: EMERGENCY MEDICINE

## 2023-07-10 PROCEDURE — 84484 ASSAY OF TROPONIN QUANT: CPT

## 2023-07-10 PROCEDURE — 85025 COMPLETE CBC W/AUTO DIFF WBC: CPT

## 2023-07-10 NOTE — ED INITIAL ASSESSMENT (HPI)
PT STATES SHE WAS WATCHING TV APPROX 1 HR PTA AND STARTED FEELING A FLUTTERING IN HER CHEST. C/o TIGHTNESS TO CHEST. dENIES SOB, N/V, LIGHTHEADED OR DIZZINESS.

## 2023-08-14 ENCOUNTER — HOSPITAL ENCOUNTER (EMERGENCY)
Age: 78
Discharge: LEFT BEFORE DISCHARGE | End: 2023-08-14
Payer: MEDICARE

## 2023-08-15 ENCOUNTER — HOSPITAL ENCOUNTER (EMERGENCY)
Age: 78
Discharge: HOME | End: 2023-08-15
Attending: EMERGENCY MEDICINE
Payer: MEDICARE

## 2023-08-15 VITALS
RESPIRATION RATE: 16 BRPM | DIASTOLIC BLOOD PRESSURE: 86 MMHG | HEIGHT: 60 IN | BODY MASS INDEX: 27.48 KG/M2 | TEMPERATURE: 98 F | WEIGHT: 140 LBS | SYSTOLIC BLOOD PRESSURE: 175 MMHG | OXYGEN SATURATION: 96 % | HEART RATE: 75 BPM

## 2023-08-15 DIAGNOSIS — J06.9 UPPER RESPIRATORY TRACT INFECTION, UNSPECIFIED TYPE: Primary | ICD-10-CM

## 2023-08-15 LAB — SARS-COV-2 RNA RESP QL NAA+PROBE: NOT DETECTED

## 2023-08-15 PROCEDURE — 99283 EMERGENCY DEPT VISIT LOW MDM: CPT

## 2023-08-15 PROCEDURE — 99282 EMERGENCY DEPT VISIT SF MDM: CPT

## 2023-09-05 ENCOUNTER — ANESTHESIA EVENT (OUTPATIENT)
Dept: ENDOSCOPY | Facility: HOSPITAL | Age: 78
End: 2023-09-05
Payer: MEDICARE

## 2023-09-06 ENCOUNTER — HOSPITAL ENCOUNTER (OUTPATIENT)
Facility: HOSPITAL | Age: 78
Setting detail: HOSPITAL OUTPATIENT SURGERY
Discharge: HOME | End: 2023-09-06
Attending: INTERNAL MEDICINE | Admitting: INTERNAL MEDICINE
Payer: MEDICARE

## 2023-09-06 ENCOUNTER — ANESTHESIA (OUTPATIENT)
Dept: ENDOSCOPY | Facility: HOSPITAL | Age: 78
End: 2023-09-06
Payer: MEDICARE

## 2023-09-06 VITALS
SYSTOLIC BLOOD PRESSURE: 169 MMHG | TEMPERATURE: 98 F | RESPIRATION RATE: 16 BRPM | HEIGHT: 60 IN | OXYGEN SATURATION: 92 % | WEIGHT: 140 LBS | HEART RATE: 72 BPM | DIASTOLIC BLOOD PRESSURE: 66 MMHG | BODY MASS INDEX: 27.48 KG/M2

## 2023-09-06 DIAGNOSIS — R14.0 BLOATING: ICD-10-CM

## 2023-09-06 DIAGNOSIS — K21.9 GASTROESOPHAGEAL REFLUX DISEASE, UNSPECIFIED WHETHER ESOPHAGITIS PRESENT: ICD-10-CM

## 2023-09-06 PROCEDURE — 88305 TISSUE EXAM BY PATHOLOGIST: CPT | Performed by: INTERNAL MEDICINE

## 2023-09-06 PROCEDURE — 0DB98ZX EXCISION OF DUODENUM, VIA NATURAL OR ARTIFICIAL OPENING ENDOSCOPIC, DIAGNOSTIC: ICD-10-PCS | Performed by: INTERNAL MEDICINE

## 2023-09-06 PROCEDURE — 0DB58ZX EXCISION OF ESOPHAGUS, VIA NATURAL OR ARTIFICIAL OPENING ENDOSCOPIC, DIAGNOSTIC: ICD-10-PCS | Performed by: INTERNAL MEDICINE

## 2023-09-06 PROCEDURE — 0DB78ZX EXCISION OF STOMACH, PYLORUS, VIA NATURAL OR ARTIFICIAL OPENING ENDOSCOPIC, DIAGNOSTIC: ICD-10-PCS | Performed by: INTERNAL MEDICINE

## 2023-09-06 RX ORDER — SODIUM CHLORIDE, SODIUM LACTATE, POTASSIUM CHLORIDE, CALCIUM CHLORIDE 600; 310; 30; 20 MG/100ML; MG/100ML; MG/100ML; MG/100ML
INJECTION, SOLUTION INTRAVENOUS CONTINUOUS
Status: DISCONTINUED | OUTPATIENT
Start: 2023-09-06 | End: 2023-09-06

## 2023-09-06 RX ORDER — LIDOCAINE HYDROCHLORIDE 10 MG/ML
INJECTION, SOLUTION EPIDURAL; INFILTRATION; INTRACAUDAL; PERINEURAL AS NEEDED
Status: DISCONTINUED | OUTPATIENT
Start: 2023-09-06 | End: 2023-09-06 | Stop reason: SURG

## 2023-09-06 RX ADMIN — LIDOCAINE HYDROCHLORIDE 25 MG: 10 INJECTION, SOLUTION EPIDURAL; INFILTRATION; INTRACAUDAL; PERINEURAL at 08:33:00

## 2023-09-06 RX ADMIN — SODIUM CHLORIDE, SODIUM LACTATE, POTASSIUM CHLORIDE, CALCIUM CHLORIDE: 600; 310; 30; 20 INJECTION, SOLUTION INTRAVENOUS at 08:32:00

## 2023-09-06 NOTE — ANESTHESIA POSTPROCEDURE EVALUATION
[address] [place] [address] Patient Status:  Hospital Outpatient Surgery   Age/Gender 76year old female MRN [id number]   Location [address] [address] Attending Lokesh Ray MD   Hosp Day # 0 PCP Maged Garcia MD       Anesthesia Post-op Note    ESOPHAGOGASTRODUODENOSCOPY (EGD) with biopsies, ENDOSCOPIC ULTRASOUND (EUS)    Procedure Summary       Date: 09/06/23 Room / Location: [facility] ENDOSCOPY 02 / [facility]    Anesthesia Start: 0107 Anesthesia Stop: 0765    Procedures:       ESOPHAGOGASTRODUODENOSCOPY (EGD) with biopsies, ENDOSCOPIC ULTRASOUND (EUS)      ENDOSCOPIC ULTRASOUND (EUS) Diagnosis:       Bloating      Gastroesophageal reflux disease, unspecified whether esophagitis present      (EGD-Hiatal hernia  EUS-incomplete EUS)    Surgeons: Lokesh Ray MD Anesthesiologist: Lorne Godfrey MD    Anesthesia Type: MAC ASA Status: 2            Anesthesia Type: MAC    Vitals Value Taken Time   /55 09/06/23 0858   Temp 98.0 09/06/23 0900   Pulse 79 09/06/23 0859   Resp 16 09/06/23 0858   SpO2 94 % 09/06/23 0859   Vitals shown include unvalidated device data. Patient Location: Endoscopy    Anesthesia Type: MAC    Airway Patency: patent    Postop Pain Control: adequate    Mental Status: mildly sedated but able to meaningfully participate in the post-anesthesia evaluation    Nausea/Vomiting: none    Cardiopulmonary/Hydration status: stable euvolemic    Complications: no apparent anesthesia related complications    Postop vital signs: stable    Dental Exam: Unchanged from Preop    Patient to be discharged home when criteria met.

## 2023-11-16 ENCOUNTER — HOSPITAL ENCOUNTER (OUTPATIENT)
Dept: MRI IMAGING | Facility: HOSPITAL | Age: 78
Discharge: HOME | End: 2023-11-16
Attending: INTERNAL MEDICINE
Payer: MEDICARE

## 2023-11-16 DIAGNOSIS — K83.8 DILATION OF BILIARY TRACT: ICD-10-CM

## 2023-11-16 DIAGNOSIS — Z90.49 S/P CHOLECYSTECTOMY: ICD-10-CM

## 2023-11-16 PROCEDURE — 74183 MRI ABD W/O CNTR FLWD CNTR: CPT | Performed by: INTERNAL MEDICINE

## 2023-11-16 PROCEDURE — A9575 INJ GADOTERATE MEGLUMI 0.1ML: HCPCS | Performed by: INTERNAL MEDICINE

## 2023-11-16 PROCEDURE — 76376 3D RENDER W/INTRP POSTPROCES: CPT | Performed by: INTERNAL MEDICINE

## 2023-11-16 RX ORDER — GADOTERATE MEGLUMINE 376.9 MG/ML
15 INJECTION INTRAVENOUS
Status: COMPLETED | OUTPATIENT
Start: 2023-11-16 | End: 2023-11-16

## 2023-11-16 RX ADMIN — GADOTERATE MEGLUMINE 12 ML: 376.9 INJECTION INTRAVENOUS at 15:22:00

## 2024-04-27 ENCOUNTER — HOSPITAL ENCOUNTER (EMERGENCY)
Age: 79
Discharge: HOME | End: 2024-04-27
Payer: MEDICARE

## 2024-04-27 VITALS
DIASTOLIC BLOOD PRESSURE: 57 MMHG | HEART RATE: 66 BPM | OXYGEN SATURATION: 97 % | RESPIRATION RATE: 16 BRPM | WEIGHT: 140 LBS | SYSTOLIC BLOOD PRESSURE: 179 MMHG | TEMPERATURE: 98 F | BODY MASS INDEX: 27 KG/M2

## 2024-04-27 DIAGNOSIS — S05.02XA ABRASION OF LEFT CORNEA, INITIAL ENCOUNTER: Primary | ICD-10-CM

## 2024-04-27 PROCEDURE — 99283 EMERGENCY DEPT VISIT LOW MDM: CPT

## 2024-04-27 RX ORDER — TETRACAINE HYDROCHLORIDE 5 MG/ML
1 SOLUTION OPHTHALMIC ONCE
Status: COMPLETED | OUTPATIENT
Start: 2024-04-27 | End: 2024-04-27

## 2024-04-27 RX ORDER — POLYMYXIN B SULFATE AND TRIMETHOPRIM 1; 10000 MG/ML; [USP'U]/ML
1 SOLUTION OPHTHALMIC EVERY 6 HOURS
Qty: 1 EACH | Refills: 0 | Status: SHIPPED | OUTPATIENT
Start: 2024-04-27 | End: 2024-05-04

## 2024-04-28 NOTE — ED PROVIDER NOTES
Patient Seen in: [facility] In [place]      History     Chief Complaint   Patient presents with    Eye Pain     Stated Complaint: left eye pain and discomfort    Subjective:   HPI    Patient is a pleasant 78-year-old female with macular degeneration, glaucoma, and dry eyes here for evaluation of foreign body sensation of left eye.  Patient states she felt that something flew into her eye around 1030 this morning.  Has felt irritation.  Denies severe eye pain, discharge or vision changes.      Objective:   Past Medical History:    Abdominal distention    Abdominal pain    Arthritis    Back pain    Bloating    Calculus of kidney    Constipation    Disorder of thyroid    Eye disease    Fatigue    Flatulence/gas pain/belching    Food intolerance    Frequent urination    Frequent use of laxatives    Glaucoma    Headache disorder    Heartburn    Hemorrhoids    HYPOTHYROIDISM    Indigestion    Migraines    OSTEOPOROSIS    Pain in joints    RA (rheumatoid arthritis) (HCC)    Thyroid disease    Uncomfortable fullness after meals    Visual impairment    reading glasses    Wears glasses              Past Surgical History:   Procedure Laterality Date    Arthroplasty,elbow,total prosth repl  1990    Cataract  09/2018    Cholecystectomy  08/2010    Colonoscopy  18 years ago    Excis lumbar disk,one level      Foot/toes surgery proc unlisted  1995    Knee replacement surgery  1983,1984    Left knee, Right knee    Needle biopsy liver  Over 20 years    Other surgical history  1987    Knuckle replacement    Other surgical history  1996    Cervical fusion C-1, C-2    Total abdom hysterectomy  8 years ago                Social History     Socioeconomic History    Marital status:    Tobacco Use    Smoking status: Never    Smokeless tobacco: Never   Vaping Use    Vaping status: Never Used   Substance and Sexual Activity    Alcohol use: No    Drug use: No    Sexual activity: Not Currently     Partners: Male               Review of Systems    Positive for stated complaint: left eye pain and discomfort  Other systems are as noted in HPI.  Constitutional and vital signs reviewed.      All other systems reviewed and negative except as noted above.    Physical Exam     ED Triage Vitals [04/27/24 1541]   BP (!) 179/57   Pulse 66   Resp 16   Temp 97.9 °F (36.6 °C)   Temp src Temporal   SpO2 97 %   O2 Device None (Room air)       Current:BP (!) 179/57   Pulse 66   Temp 97.9 °F (36.6 °C) (Temporal)   Resp 16   Wt 63.5 kg   SpO2 97%   BMI 27.34 kg/m²         Physical Exam  Vitals and nursing note reviewed.   Constitutional:       General: She is not in acute distress.     Appearance: Normal appearance. She is not ill-appearing, toxic-appearing or diaphoretic.   Eyes:      General: Lids are normal. Vision grossly intact. No visual field deficit.        Right eye: No discharge.         Left eye: No foreign body or discharge.      Extraocular Movements: Extraocular movements intact.      Left eye: Normal extraocular motion.      Conjunctiva/sclera: Conjunctivae normal.      Left eye: Left conjunctiva is not injected.      Pupils: Pupils are equal, round, and reactive to light.        Comments: Small corneal abrasion noted with fluorescein stain.  There is no obvious foreign body, conjunctival erythema, edema or discharge.   Neurological:      Mental Status: She is alert.             ED Course   Labs Reviewed - No data to display                   MDM                         Medical Decision Making  Differentials include but are not limited to foreign body and corneal abrasion.  Patient states symptoms had resolved upon arrival to emergency department.  Small corneal abrasion visible with fluorescein stain.  There is no obvious foreign body.  Will plan to start Polytrim.  Close outpatient follow-up with [place] [facility].  Monitoring parameters and return precautions reviewed with patient who agrees plan of care.  All  questions answered to patient's satisfaction.        Disposition and Plan     Clinical Impression:  1. Abrasion of left cornea, initial encounter         Disposition:  Discharge  4/27/2024  4:15 pm    Follow-up:  [facility] [address] [address]  [phone]  Follow up in 3 day(s)            Medications Prescribed:  Discharge Medication List as of 4/27/2024  4:23 PM        START taking these medications    Details   polymyxin B-trimethoprim 42158-2.1 UNIT/ML-% Ophthalmic Solution Place 1 drop into the left eye every 6 (six) hours for 7 days., Normal, Disp-1 each, R-0

## 2025-03-10 ENCOUNTER — HOSPITAL ENCOUNTER (EMERGENCY)
Age: 80
Discharge: HOME | End: 2025-03-10
Attending: EMERGENCY MEDICINE
Payer: MEDICARE

## 2025-03-10 ENCOUNTER — APPOINTMENT (OUTPATIENT)
Dept: GENERAL RADIOLOGY | Age: 80
End: 2025-03-10
Attending: EMERGENCY MEDICINE
Payer: MEDICARE

## 2025-03-10 VITALS
RESPIRATION RATE: 16 BRPM | HEIGHT: 60 IN | WEIGHT: 136.69 LBS | DIASTOLIC BLOOD PRESSURE: 51 MMHG | BODY MASS INDEX: 26.84 KG/M2 | SYSTOLIC BLOOD PRESSURE: 150 MMHG | TEMPERATURE: 99 F | OXYGEN SATURATION: 96 % | HEART RATE: 71 BPM

## 2025-03-10 DIAGNOSIS — G89.29 CHRONIC NECK PAIN: Primary | ICD-10-CM

## 2025-03-10 DIAGNOSIS — M54.2 CHRONIC NECK PAIN: Primary | ICD-10-CM

## 2025-03-10 DIAGNOSIS — R20.2 PARESTHESIAS: ICD-10-CM

## 2025-03-10 PROCEDURE — 93010 ELECTROCARDIOGRAM REPORT: CPT

## 2025-03-10 PROCEDURE — 93005 ELECTROCARDIOGRAM TRACING: CPT

## 2025-03-10 PROCEDURE — 99284 EMERGENCY DEPT VISIT MOD MDM: CPT

## 2025-03-10 PROCEDURE — 72050 X-RAY EXAM NECK SPINE 4/5VWS: CPT | Performed by: EMERGENCY MEDICINE

## 2025-03-10 RX ORDER — CYCLOBENZAPRINE HCL 10 MG
10 TABLET ORAL ONCE
Status: COMPLETED | OUTPATIENT
Start: 2025-03-10 | End: 2025-03-10

## 2025-03-10 RX ORDER — LOSARTAN POTASSIUM 25 MG/1
25 TABLET ORAL
COMMUNITY
Start: 2025-01-14

## 2025-03-10 RX ORDER — CYCLOBENZAPRINE HCL 5 MG
5 TABLET ORAL 3 TIMES DAILY PRN
Qty: 20 TABLET | Refills: 0 | Status: SHIPPED | OUTPATIENT
Start: 2025-03-10 | End: 2025-03-17

## 2025-03-10 NOTE — ED QUICK NOTES
Patient states pain has not decreased after meds given. Pt ambulatory to restroom. Pt and family updated on POC.

## 2025-03-10 NOTE — ED PROVIDER NOTES
Patient Seen in: [facility] In [place]      History     Chief Complaint   Patient presents with    Neck Pain     Stated Complaint: neck pain for a couple weeks    Subjective:   HPI      79-year-old female presents with multiple complaints.  States neck pain x 1 to 1-1/2 months.  She has bilateral torn rotator cuffs and a history of a cervical fusion and feels like she is having tightness and muscle spasm from her shoulders all the way up to her neck.  Worse with certain movements.  No weakness or numbness or tingling.  Also complains of a diminished sensation to her forehead and scalp on the right side.  No facial weakness, no visual changes.  Numbness/sensory changes are very localized to right temporal region a thin band along the right scalp and occipital area.    Objective:     Past Medical History:    Abdominal distention    Abdominal pain    Arthritis    Back pain    Bloating    Calculus of kidney    Constipation    Disorder of thyroid    Eye disease    Fatigue    Flatulence/gas pain/belching    Food intolerance    Frequent urination    Frequent use of laxatives    Glaucoma    Headache disorder    Heartburn    Hemorrhoids    HYPOTHYROIDISM    Indigestion    Migraines    OSTEOPOROSIS    Pain in joints    RA (rheumatoid arthritis) (HCC)    Thyroid disease    Uncomfortable fullness after meals    Visual impairment    reading glasses    Wears glasses              Past Surgical History:   Procedure Laterality Date    Arthroplasty,elbow,total prosth repl  1990    Cataract  09/2018    Cholecystectomy  08/2010    Colonoscopy  18 years ago    Excis lumbar disk,one level      Foot/toes surgery proc unlisted  1995    Knee replacement surgery  1983,1984    Left knee, Right knee    Needle biopsy liver  Over 20 years    Other surgical history  1987    Knuckle replacement    Other surgical history  1996    Cervical fusion C-1, C-2    Total abdom hysterectomy  8 years ago                Social History      Socioeconomic History    Marital status:    Tobacco Use    Smoking status: Never    Smokeless tobacco: Never   Vaping Use    Vaping status: Never Used   Substance and Sexual Activity    Alcohol use: No    Drug use: No    Sexual activity: Not Currently     Partners: Male                  Physical Exam     ED Triage Vitals [03/10/25 1040]   /85   Pulse 89   Resp 20   Temp 99.1 °F (37.3 °C)   Temp src Temporal   SpO2 99 %   O2 Device None (Room air)       Current Vitals:   Vital Signs  BP: 150/51  Pulse: 71  Resp: 16  Temp: 99.1 °F (37.3 °C)  Temp src: Temporal    Oxygen Therapy  SpO2: 96 %  O2 Device: None (Room air)        Physical Exam  Constitutional:       General: Is not in acute distress.     Appearance: Is not ill-appearing.   HENT:      Head: Normocephalic and atraumatic.      Mouth/Throat:      Mouth: Mucous membranes are moist.   Eyes:      Conjunctiva/sclera: Conjunctivae normal.      Pupils: Pupils are equal, round, and reactive to light.   Cardiovascular:      Rate and Rhythm: Normal rate and regular rhythm.   Pulmonary:      Effort: Pulmonary effort is normal. No respiratory distress.      Breath sounds: Normal breath sounds.   Abdominal:      General: Abdomen is flat. There is no distension.      Tenderness: There is no abdominal tenderness.   Musculoskeletal:      Right lower leg: No edema.      Left lower leg: No edema.   Skin:     General: Skin is warm and dry.   Neurological: 5 out of 5 strength throughout, thin band of subjective sensory changes along right temporal region, right scalp and occiput, cranial nerves otherwise intact     General: No focal deficit present.      Mental Status: Is alert.       ED Course   Labs Reviewed - No data to display  EKG    Rate, intervals and axes as noted on EKG Report.  Rate: 72  Rhythm: Normal sinus rhythm  Reading: No ST elevation or depression, no arrhythmia                       MDM      Considered all emergent etiologies, differential  includes but is not limited to: Stroke, TIA, Bell's palsy, neuropathy, paresthesias, cervical spasm     I have independently visualized the radiology images, my focus/limited interpretation: Cervical x-ray negative for fracture     Defer to radiologist for other/incidental findings    Neck pain on examination most consistent with musculoskeletal, likely large component of muscle spasm.  Significant improvement after muscle relaxant in the ED.  No signs of neural compromise.  X-ray showing largely chronic and degenerative, surgical rate related changes.  Paresthesias to the right side of her scalp not consistent with CVA.  Possible early Bell's palsy or shingles.  Discussed at length differential with patient.  Advise follow-up with PCP for further evaluation and continued monitoring.  Discussed red flags and return precautions at length.        Medical Decision Making      Disposition and Plan     Clinical Impression:  1. Chronic neck pain    2. Paresthesias         Disposition:  Discharge  3/10/2025 12:28 pm    Follow-up:  Jack Crowley MD  [address] [place] [id number]  [phone]    Follow up            Medications Prescribed:  Current Discharge Medication List        START taking these medications    Details   cyclobenzaprine 5 MG Oral Tab Take 1 tablet (5 mg total) by mouth 3 (three) times daily as needed for Muscle spasms.  Qty: 20 tablet, Refills: 0                 Supplementary Documentation:

## 2025-03-10 NOTE — ED INITIAL ASSESSMENT (HPI)
Pt reports bilat posterior neck pain radiating to back of head for a couple weeks. Pt states she has chronic neck pain with a hx of cervical fusion. Pt recently was cleaning out her closet with more lifting than normal. This am pt states she noticed numbness to skin on rt forehead.

## 2025-03-12 ENCOUNTER — OFFICE VISIT (OUTPATIENT)
Dept: NEUROLOGY | Facility: CLINIC | Age: 80
End: 2025-03-12
Payer: MEDICARE

## 2025-03-12 VITALS
BODY MASS INDEX: 27.48 KG/M2 | OXYGEN SATURATION: 94 % | HEART RATE: 77 BPM | RESPIRATION RATE: 16 BRPM | HEIGHT: 60 IN | DIASTOLIC BLOOD PRESSURE: 80 MMHG | SYSTOLIC BLOOD PRESSURE: 150 MMHG | WEIGHT: 140 LBS

## 2025-03-12 DIAGNOSIS — R20.2 NUMBNESS AND TINGLING OF LEFT SIDE OF FACE: Primary | ICD-10-CM

## 2025-03-12 DIAGNOSIS — R20.0 NUMBNESS AND TINGLING OF LEFT SIDE OF FACE: Primary | ICD-10-CM

## 2025-03-12 DIAGNOSIS — M54.2 NECK PAIN: ICD-10-CM

## 2025-03-12 LAB
ATRIAL RATE: 72 BPM
P AXIS: 59 DEGREES
P-R INTERVAL: 196 MS
Q-T INTERVAL: 366 MS
QRS DURATION: 78 MS
QTC CALCULATION (BEZET): 400 MS
R AXIS: 7 DEGREES
T AXIS: 46 DEGREES
VENTRICULAR RATE: 72 BPM

## 2025-03-12 PROCEDURE — 99204 OFFICE O/P NEW MOD 45 MIN: CPT | Performed by: OTHER

## 2025-03-12 RX ORDER — GABAPENTIN 100 MG/1
100 CAPSULE ORAL 3 TIMES DAILY
Qty: 90 CAPSULE | Refills: 3 | Status: SHIPPED | OUTPATIENT
Start: 2025-03-12

## 2025-03-12 NOTE — PROGRESS NOTES
[facility]    Date of consult: 3/12/2025    The following individual(s) verbally consented to be recorded using ambient AI listening technology and understand that they can each withdraw their consent to this listening technology at any point by asking the clinician to turn off or pause the recording:    Patient name: Nory Reid      Assessment:    ICD-10-CM    1. Numbness and tingling of left side of face  R20.0 MRI BRAIN (CPT=40551) [[id number]]    R20.2 MRI SPINE CERVICAL (CPT=72141) [[id number]]     Referral to Physical Therapy and Rehab      2. Neck pain  M54.2 MRI SPINE CERVICAL (CPT=72141) [[id number]]     Referral to Physical Therapy and Rehab        Plan:      Procedures    Referral to Physical Therapy and Rehab    MRI BRAIN (CPT=40551) [[id number]]    MRI SPINE CERVICAL (CPT=72141) [[id number]]   Gabapentin low dose trial  See orders and medications filed with this encounter. The patient indicates understanding of these issues and agrees with the plan.  Discussed with patient/family regarding assessment, work up, care plan and possible adverse and side effects of the medications.  RTC 3 months  Pt should go ER for any new or worsening symptoms and contact office for above tests' results, any possible side effects from medication or other concerns.    Subjective:   CC/Reason for consult: right face numbness, neck pain     HPI:   History of Present Illness  The patient, with severe rheumatoid arthritis, presents with head and neck numbness and neck pain. She was referred by the ER for further evaluation of her symptoms.    She experiences head and neck numbness along with neck pain. A cervical fusion at C1 and C2 was performed approximately 24 years ago due to severe rheumatoid arthritis. No issues arose post-surgery until the last month when significant neck pain began, initially attributed to arthritis and a dislocated shoulder. The pain is bilateral, radiating between the shoulder blades  and up the neck.    Three days ago, she woke up with numbness on the right side of her forehead extending to the back of her head, coinciding with the location of a silver plate implanted after a childhood accident. The sensation is described as a 'weird feeling' and is persistent, not associated with any specific activity like bending over. No numbness in arms or face, and she reports a subsiding pain over her eyebrow.    She has been taking Norco for her rheumatoid arthritis pain, consuming three to four tablets daily, sometimes increasing to one and a half tablets when the pain is severe. She visited the ER where neck x-rays were performed.      History/Other:   REVIEW OF SYSTEMS:  A comprehensive 14-point system was reviewed. Pertinent positives and negatives are noted in HPI.       Current Outpatient Medications:     gabapentin 100 MG Oral Cap, Take 1 capsule (100 mg total) by mouth 3 (three) times daily., Disp: 90 capsule, Rfl: 3    losartan 25 MG Oral Tab, 1 tablet (25 mg total)., Disp: , Rfl:     cyclobenzaprine 5 MG Oral Tab, Take 1 tablet (5 mg total) by mouth 3 (three) times daily as needed for Muscle spasms., Disp: 20 tablet, Rfl: 0    Esomeprazole Magnesium 40 MG Oral Capsule Delayed Release, Take 1 capsule (40 mg total) by mouth daily., Disp: , Rfl:     Tafluprost, PF, 0.0015 % Ophthalmic Solution, Place 1 drop into both eyes nightly., Disp: , Rfl:     HYDROcodone-acetaminophen  MG Oral Tab, Take 1 tablet by mouth every 4 to 6 hours as needed., Disp: , Rfl:     Denosumab 60 MG/ML Subcutaneous Solution Prefilled Syringe, Inject 1 mL (60 mg total) into the skin every 6 (six) months. Historical documentation - see Epic Immunization Activity for administration details, Disp: 1 mL, Rfl: 0    SUMATRIPTAN SUCCINATE 50 MG Oral Tab, TAKE 1 ABLET BY MOUTH EVERY 2 HOURS AS NEEDED FOR MIGRAINE, USE AT ONSET AND REPEAT ONCE AFTER 2 HOURS (MAX 2 TABLETS PER 24 HOURS) , Disp: 9 tablet, Rfl: 0    Tocilizumab  (ACTEMRA SC), Inject into the skin every 30 (thirty) days., Disp: , Rfl:     SYNTHROID 125 MCG OR TABS, Take 1 tablet (125 mcg total) by mouth before breakfast., Disp: , Rfl:     ALPRAZolam 0.5 MG Oral Tab, Take 1 tablet (0.5 mg total) by mouth nightly as needed for Sleep. (Patient not taking: Reported on 3/12/2025), Disp: , Rfl:   Allergies:  Allergies[1]  Past Medical History:    Abdominal distention    Abdominal pain    Arthritis    Back pain    Bloating    Calculus of kidney    Constipation    Disorder of thyroid    Eye disease    Fatigue    Flatulence/gas pain/belching    Food intolerance    Frequent urination    Frequent use of laxatives    Glaucoma    Headache disorder    Heartburn    Hemorrhoids    HYPOTHYROIDISM    Indigestion    Migraines    OSTEOPOROSIS    Pain in joints    RA (rheumatoid arthritis) (HCC)    Thyroid disease    Uncomfortable fullness after meals    Visual impairment    reading glasses    Wears glasses     Past Surgical History:   Procedure Laterality Date    Arthroplasty,elbow,total prosth repl  1990    Cataract  09/2018    Cholecystectomy  08/2010    Colonoscopy  18 years ago    Excis lumbar disk,one level      Foot/toes surgery proc unlisted  1995    Knee replacement surgery  1983,1984    Left knee, Right knee    Needle biopsy liver  Over 20 years    Other surgical history  1987    Knuckle replacement    Other surgical history  1996    Cervical fusion C-1, C-2    Total abdom hysterectomy  8 years ago     Social History:  Social History     Tobacco Use    Smoking status: Never    Smokeless tobacco: Never   Substance Use Topics    Alcohol use: No     Family History   Problem Relation Age of Onset    Heart Disease Mother     Hypertension Mother     Heart Disease Father     Hypertension Father     Cancer Brother         Throat Cancer    Cancer Brother         Esophageal Cancer    Diabetes Brother     Diabetes Son        Objective:   Physical Examination:  /80   Pulse 77   Resp 16    Ht 60\"   Wt 140 lb (63.5 kg)   SpO2 94%   BMI 27.34 kg/m²   General: Awake and alert; in no acute distress  HEENT: Eye sclerae are anicteric; scalp is atraumatic  Neck: Supple  Cardiac: Regular rate and regular rhythm  Lungs: Clear   Abdomen:  non-tender  Extremities: No clubbing or cyanosis; moves extremities   Psychiatric: Normal mood and affect; answers questions appropriately  Dermatologic: No rashes; no edema  Neurological Examination:  Language: normal   Speech: no dysarthria  CN: II-XII intact except diminished PP in right V1  Motor strength: 5/5 all extremities  Tone: normal  DTRs: 1+ symmetric  Coordination: Normal FTN  Sensory: symmetric   Gait: nl    Data Reviewed on 3/12/2025  Notes Reviewed on 3/12/2025  Labs Reviewed  on 3/12/2025    Agueda \"Edgar\"MD S[place]   Neurology  [facility]  3/12/2025, 10:16 AM  Consultation Report: being sent/fax/route to requesting provider   CC: Jack Crowley MD         [1]   Allergies  Allergen Reactions    Compazine ANAPHYLAXIS    Nsaids HIVES    Peanuts TONGUE SWELLING    Morphine HALLUCINATION

## 2025-03-12 NOTE — PATIENT INSTRUCTIONS
Refill policies:    Allow 2-3 business days for refills; controlled substances may take longer.  Contact your pharmacy at least 5 days prior to running out of medication and have them send an electronic request or submit request through the “request refill” option in your [employer] account.  Refills are not addressed on weekends; covering physicians do not authorize routine medications on weekends.  No narcotics or controlled substances are refilled after noon on Fridays or by on call physicians.  By law, narcotics must be electronically prescribed.  A 30 day supply with no refills is the maximum allowed.  If your prescription is due for a refill, you may be due for a follow up appointment.  To best provide you care, patients receiving routine medications need to be seen at least once a year.  Patients receiving narcotic/controlled substance medications need to be seen at least once every 3 months.  In the event that your preferred pharmacy does not have the requested medication in stock (e.g. Backordered), it is your responsibility to find another pharmacy that has the requested medication available.  We will gladly send a new prescription to that pharmacy at your request.    Scheduling Tests:    If your physician has ordered radiology tests such as MRI or CT scans, please contact Central Scheduling at [phone] right away to schedule the test.  Once scheduled, the [facility] Centralized Referral Team will work with your insurance carrier to obtain pre-certification or prior authorization.  Depending on your insurance carrier, approval may take 3-10 days.  It is highly recommended patients assure they have received an authorization before having a test performed.  If test is done without insurance authorization, patient may be responsible for the entire amount billed.      Precertification and Prior Authorizations:  If your physician has recommended that you have a procedure or additional testing performed the [facility]  Centralized Referral Team will contact your insurance carrier to obtain pre-certification or prior authorization.    You are strongly encouraged to contact your insurance carrier to verify that your procedure/test has been approved and is a COVERED benefit.  Although the [facility] Centralized Referral Team does its due diligence, the insurance carrier gives the disclaimer that \"Although the procedure is authorized, this does not guarantee payment.\"    Ultimately the patient is responsible for payment.   Thank you for your understanding in this matter.  Paperwork Completion:  If you require FMLA or disability paperwork for your recovery, please make sure to either drop it off or have it faxed to our office at [phone]. Be sure the form has your name and date of birth on it.  The form will be faxed to our Forms Department and they will complete it for you.  There is a 25$ fee for all forms that need to be filled out.  Please be aware there is a 10-14 day turnaround time.  You will need to sign a release of information (PHAM) form if your paperwork does not come with one.  You may call the Forms Department with any questions at [phone].  Their fax number is [phone].

## 2025-05-09 ENCOUNTER — APPOINTMENT (OUTPATIENT)
Dept: GENERAL RADIOLOGY | Age: 80
End: 2025-05-09
Attending: PHYSICIAN ASSISTANT
Payer: MEDICARE

## 2025-05-09 ENCOUNTER — APPOINTMENT (OUTPATIENT)
Dept: CT IMAGING | Age: 80
End: 2025-05-09
Attending: PHYSICIAN ASSISTANT
Payer: MEDICARE

## 2025-05-09 ENCOUNTER — HOSPITAL ENCOUNTER (EMERGENCY)
Age: 80
Discharge: HOME | End: 2025-05-09
Attending: EMERGENCY MEDICINE
Payer: MEDICARE

## 2025-05-09 VITALS
WEIGHT: 138 LBS | SYSTOLIC BLOOD PRESSURE: 184 MMHG | DIASTOLIC BLOOD PRESSURE: 72 MMHG | RESPIRATION RATE: 18 BRPM | HEART RATE: 66 BPM | HEIGHT: 60 IN | OXYGEN SATURATION: 94 % | TEMPERATURE: 97 F | BODY MASS INDEX: 27.09 KG/M2
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
## 2025-05-09 DIAGNOSIS — S60.222A CONTUSION OF LEFT HAND, INITIAL ENCOUNTER: ICD-10-CM

## 2025-05-09 DIAGNOSIS — S00.83XA FOREHEAD CONTUSION, INITIAL ENCOUNTER: ICD-10-CM
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
## 2025-05-09 DIAGNOSIS — S16.1XXA STRAIN OF NECK MUSCLE, INITIAL ENCOUNTER: ICD-10-CM

## 2025-05-09 PROCEDURE — 73130 X-RAY EXAM OF HAND: CPT | Performed by: EMERGENCY MEDICINE

## 2025-05-09 PROCEDURE — 70450 CT HEAD/BRAIN W/O DYE: CPT | Performed by: PHYSICIAN ASSISTANT

## 2025-05-09 PROCEDURE — 99284 EMERGENCY DEPT VISIT MOD MDM: CPT

## 2025-05-09 PROCEDURE — 99285 EMERGENCY DEPT VISIT HI MDM: CPT

## 2025-05-09 PROCEDURE — 96372 THER/PROPH/DIAG INJ SC/IM: CPT

## 2025-05-09 PROCEDURE — 72125 CT NECK SPINE W/O DYE: CPT | Performed by: PHYSICIAN ASSISTANT

## 2025-05-09 RX ORDER — HYDROCODONE BITARTRATE AND ACETAMINOPHEN 5; 325 MG/1; MG/1
1-2 TABLET ORAL EVERY 6 HOURS PRN
Qty: 10 TABLET | Refills: 0 | Status: SHIPPED | OUTPATIENT
Start: 2025-05-09 | End: 2025-05-14

## 2025-05-09 RX ORDER — HYDROMORPHONE HYDROCHLORIDE 1 MG/ML
0.5 INJECTION, SOLUTION INTRAMUSCULAR; INTRAVENOUS; SUBCUTANEOUS ONCE
Refills: 0 | Status: COMPLETED | OUTPATIENT
Start: 2025-05-09 | End: 2025-05-09

## 2025-05-09 NOTE — ED INITIAL ASSESSMENT (HPI)
Pt tripped outside and hit head to a metal table about one hour ago, denies loc. Pt has contusion to lt forehead, pain to back of neck, bilat shoulders and lt 4th finger pain.

## 2025-05-09 NOTE — ED PROVIDER NOTES
Patient Seen in: [facility] [place]      History     Chief Complaint   Patient presents with    Fall     Stated Complaint: patient fell outside 1 hour ago. Pt injured her head, neck, shoulder and left h*    Subjective:   HPI    Patient tripped and fell forward hitting head she tripped over a hose while daughter was spray painting some outdoor furniture.  It happened 2 hours prior to me seeing her here.  She did strike her head and they have been icing it at home but she has pain in her head and her neck and in her left base of her fourth finger.  She has significant rheumatoid arthritis she is accompanied by her daughter and son-in-law to the emergency department.  She did not lose consciousness she does have some bruising over her left forehead.  She speaking in full and complete sentences.  She states she does not have time to be injured because she is hosting mother stay at her house History of Present Illness               Objective:     Past Medical History:    Abdominal distention    Abdominal pain    Arthritis    Back pain    Bloating    Calculus of kidney    Constipation    Disorder of thyroid    Eye disease    Fatigue    Flatulence/gas pain/belching    Food intolerance    Frequent urination    Frequent use of laxatives    Glaucoma    Headache disorder    Heartburn    Hemorrhoids    HYPOTHYROIDISM    Indigestion    Migraines    OSTEOPOROSIS    Pain in joints    RA (rheumatoid arthritis) (HCC)    Thyroid disease    Uncomfortable fullness after meals    Visual impairment    reading glasses    Wears glasses              Past Surgical History:   Procedure Laterality Date    Arthroplasty,elbow,total prosth repl  1990    Cataract  09/2018    Cholecystectomy  08/2010    Colonoscopy  18 years ago    Excis lumbar disk,one level      Foot/toes surgery proc unlisted  1995    Knee replacement surgery  1983,1984    Left knee, Right knee    Needle biopsy liver  Over 20 years    Other surgical  history  1987    Knuckle replacement    Other surgical history  1996    Cervical fusion C-1, C-2    Total abdom hysterectomy  8 years ago                Social History     Socioeconomic History    Marital status:    Tobacco Use    Smoking status: Never    Smokeless tobacco: Never   Vaping Use    Vaping status: Never Used   Substance and Sexual Activity    Alcohol use: No    Drug use: No    Sexual activity: Not Currently     Partners: Male   Other Topics Concern    Caffeine Concern Yes     Comment: Coffee    Exercise No                                Physical Exam     ED Triage Vitals [05/09/25 1407]   BP (!) 184/72   Pulse 66   Resp 18   Temp 96.9 °F (36.1 °C)   Temp src Temporal   SpO2 94 %   O2 Device None (Room air)       Current Vitals:   No data recorded      Physical Exam  This is a delightful 79-year-old woman lying on an emergency department bed she is awake alert and oriented she speaking in full complete sentences she has bruising and hematoma over her left forehead but her pupils are equal round and reactive to light her extraocular moods are intact her neck is nontender and she has full range of motion she has significant rheumatoid arthritis of both of her hands.  She has what appears to be swan-neck deformities of multiple of her fingers but notes that she has some tenderness at the base of her fourth finger on her left hand.  Her lungs are clear to auscultation her heart has a regular rate and rhythm her abdomen is soft and nontender she is answering questions appropriately  Physical Exam                ED Course   Labs Reviewed - No data to display       Results    XR HAND (MIN 3 VIEWS), LEFT (CPT=73130)   Final Result   PROCEDURE:  XR HAND (MIN 3 VIEWS), LEFT (CPT=73130)       TECHNIQUE:  Three views of the left hand were obtained.        COMPARISON:  None.       INDICATIONS:  patient fell outside 1 hour ago. Pt injured her head, neck,    shoulder and left hand. No blood thinners.        PATIENT STATED HISTORY: (As transcribed by Technologist)  Patient fell on    her hand today, she denies any significant pain. Patient has a history of    left knuckle replacement surgery.             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  There are    erosive changes seen at the left 4th through 5th MCP joints.  There are    advanced degenerative changes seen    at the IP joints and DIP joints.  There are postsurgical changes noted    from fixation across the left 1st proximal phalanx.  There are severe    degenerative changes at the left 1st carpometacarpal joint.  These imaging    findings may represent the sequelae of    an inflammatory arthropathy such as rheumatoid arthritis.  Clinical    correlation with the patient's medical history is recommended.  No acute    displaced osseous fracture is evident.                         =====   CONCLUSION:  See above.           LOCATION:  [facility]           Dictated by (CST): Stromberg, LeRoy, MD on 5/09/2025 at 3:23 PM        Finalized by (CST): Stromberg, LeRoy, MD on 5/09/2025 at 3:26 PM         CT SPINE CERVICAL (CPT=72125)   Final Result   PROCEDURE:  CT SPINE CERVICAL (CPT=72125)       COMPARISON:  None.       INDICATIONS:  patient fell outside 1 hour ago. Pt injured her head, neck,    shoulder and left hand. No blood thinners.       TECHNIQUE:  Noncontrast CT scanning of the cervical spine is performed    from the skull base through C7.  Multiplanar reconstructions are    generated.  Dose reduction techniques were used. Dose information is    transmitted to the ACR (American College of    Radiology) NRDR (National Radiology Data Registry) which includes the Dose    Index Registry.       PATIENT STATED HISTORY: (As transcribed by Technologist)  Patient fell in    the parking lot. She states she hit her head and now has head and neck    pain.            FINDINGS:              There are postoperative changes from posterior spinal fixation with    prominent posterior protrusions extending through the C2 pedicles, lateral    masses of C2, and into the anterior margin of C1.  There are metallic wire    seen at the posterior    elements/spinous processes of C1, C2, and C3.  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  No hardware fracture is    identified.  No acute displaced osseous fracture is seen.  There are    moderate degenerative changes in the cervical    spine.  There is mild chronic appearing height loss of approximately C4.                             =====   CONCLUSION:  No acute displaced osseous fracture is identified.  Moderate    degenerative changes in the cervical spine.  Postoperative changes noted    at the C1 through C3 levels as described above.               LOCATION:  [id number]           Dictated by (CST): Stromberg, LeRoy, MD on 5/09/2025 at 3:14 PM        Finalized by (CST): Stromberg, LeRoy, MD on 5/09/2025 at 3:20 PM         CT BRAIN OR HEAD (CPT=70450)   Final Result   PROCEDURE:  CT BRAIN OR HEAD (64085)       COMPARISON:  [facility], CT, CTA BRAIN (CPT=70496), 1/18/2018, 9:54 PM.       INDICATIONS:  patient fell outside 1 hour ago. Pt injured her head, neck,    shoulder and left hand. No blood thinners.       TECHNIQUE:  Noncontrast CT scanning is performed through the brain. Dose    reduction techniques were used. Dose information is transmitted to the ACR    (American College of Radiology) NRDR (National Radiology Data Registry)    which includes the Dose Index    Registry.       PATIENT STATED HISTORY: (As transcribed by Technologist)  Patient fell in    the parking lot. She states she hit her head and now has head and neck    pain.             FINDINGS:     Evaluation is significantly limited by streak artifact from metallic right    scalp implant.   Within the confines of the exam no evidence of acute intracranial    hemorrhage.   No midline shift.  The visualized gray-white differentiation    is intact.  Basilar cisterns are patent.  Paranasal sinuses are patent.                         =====   CONCLUSION:     1. Significantly limited assessment secondary to streak artifact from    metallic right parietal scalp implant.   2. No acute intracranial abnormality within the confines of the exam.               LOCATION:  [facility]           Dictated by (CST): Cal Randhawa MD on 5/09/2025 at 3:22 PM        Finalized by (CST): Cal Randhawa MD on 5/09/2025 at 3:24 PM                                    MDM      Patient was encouraged to take some pain medication as she has fallen and struck the left side of her head on the edge of a metal table that her daughter was painting.  In fact she has white spray paint in her hair.  Patient does take pain medication at home for her significant and severe RA and was willing to have a shot of Dilaudid.  We need to rule out intracranial hemorrhage, acute fracture or dislocation.  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  Just because the hand is so painful we are going to apply cotton batting and then a supportive splint for comfort.  We also discussed that the large metal plate in her head and significantly limits ability to rule out an animal head injury but that the head CT images that we can see do not show evidence of a bleed I reviewed those images with the family at bedside as well.    Patient requested a shot for pain she has had Dilaudid in the past so I think that is reasonable.  She does have pain medication that she takes at home.  I have cautioned her to rest this weekend  family has cautioned her as well.    Medical Decision Making      Disposition and Plan     Clinical Impression:  1. Fall at home, initial encounter    2. Forehead contusion, initial encounter    3. Strain of neck muscle, initial encounter    4. Contusion of left hand, initial encounter         Disposition:  Discharge  5/9/2025  3:49 pm    Follow-up:  No follow-up provider specified.        Medications Prescribed:  Discharge Medication List as of 5/9/2025  3:53 PM        START taking these medications    Details   HYDROcodone-acetaminophen 5-325 MG Oral Tab Take 1-2 tablets by mouth every 6 (six) hours as needed for Pain., Normal, Disp-10 tablet, R-0             Supplementary Documentation:

## 2025-05-15 ENCOUNTER — TELEPHONE (OUTPATIENT)
Dept: NEUROLOGY | Facility: CLINIC | Age: 80
End: 2025-05-15

## 2025-05-15 NOTE — TELEPHONE ENCOUNTER
Received MRI Cervical Spine wo Cont report from [facility], placed in nurses bin for review.

## 2025-05-15 NOTE — TELEPHONE ENCOUNTER
Received MRI Cervial Spine wo from [facility] date of service 5/14/25. Placed in provider folder for review.

## 2025-05-16 NOTE — TELEPHONE ENCOUNTER
MRI c spine report reviewed. Multiple cervical spondylosis, foraminal stenosis at C4-5, C7-T1, no immediate action required, will review next visit with pt, please obtain CD for direct review of imaging.

## 2025-05-16 NOTE — TELEPHONE ENCOUNTER
Generic message left on patient's voice mail (no identifier) to return call. Office # and hours given.

## 2025-05-16 NOTE — TELEPHONE ENCOUNTER
Patient notified of provider recommendations, verbalized understanding. She will obtain CD for Dr Trotter to review.

## 2025-05-19 ENCOUNTER — TELEPHONE (OUTPATIENT)
Dept: NEUROLOGY | Facility: CLINIC | Age: 80
End: 2025-05-19

## 2025-05-23 ENCOUNTER — OFFICE VISIT (OUTPATIENT)
Dept: NEUROLOGY | Facility: CLINIC | Age: 80
End: 2025-05-23
Payer: MEDICARE

## 2025-05-23 ENCOUNTER — TELEPHONE (OUTPATIENT)
Dept: NEUROLOGY | Facility: CLINIC | Age: 80
End: 2025-05-23

## 2025-05-23 VITALS
HEART RATE: 63 BPM | OXYGEN SATURATION: 93 % | RESPIRATION RATE: 16 BRPM | BODY MASS INDEX: 27.29 KG/M2 | HEIGHT: 60 IN | WEIGHT: 139 LBS | DIASTOLIC BLOOD PRESSURE: 60 MMHG | SYSTOLIC BLOOD PRESSURE: 130 MMHG

## 2025-05-23 DIAGNOSIS — M47.812 SPONDYLOSIS OF CERVICAL REGION WITHOUT MYELOPATHY OR RADICULOPATHY: Primary | ICD-10-CM

## 2025-05-23 DIAGNOSIS — M54.2 NECK PAIN: ICD-10-CM

## 2025-05-23 DIAGNOSIS — R20.0 NUMBNESS AND TINGLING OF LEFT SIDE OF FACE: ICD-10-CM

## 2025-05-23 DIAGNOSIS — R20.2 NUMBNESS AND TINGLING OF LEFT SIDE OF FACE: ICD-10-CM

## 2025-05-23 PROCEDURE — 99215 OFFICE O/P EST HI 40 MIN: CPT | Performed by: OTHER

## 2025-05-23 NOTE — PROGRESS NOTES
The following individual(s) verbally consented to be recorded using ambient AI listening technology and understand that they can each withdraw their consent to this listening technology at any point by asking the clinician to turn off or pause the recording:    Patient name: Nory Reid  Additional names:  Tahira (daughter)

## 2025-05-23 NOTE — PROGRESS NOTES
[facility] Neurology Outpatient Progress Note  Date of service: 5/23/2025       The following individual(s) verbally consented to be recorded using ambient AI listening technology and understand that they can each withdraw their consent to this listening technology at any point by asking the clinician to turn off or pause the recording:    Patient name: Nory Reid      ICD-10-CM    1. Spondylosis of cervical region without myelopathy or radiculopathy  M47.812 Neurosurgery Referral - In Network      2. Numbness and tingling of left side of face  R20.0    Resolved  R20.2       3. Neck pain  M54.2         Assessment & Plan  - I reviewed MRI brain, C spine with pt, MRI c spine report reviewed. Multiple cervical spondylosis, foraminal stenosis at C4-5, C7-T1, no immediate action required,. MRI brain overall showed chronic changes, no acute pathology; please obtain CD for direct review of imaging  - Encouraged daily consistent gabapentin use, one pill three times a day. May titrate up as tolerated if beneficial   - Provided gabapentin refills if needed.  - refer to neurosurgery  - fall precaution advised         Procedures    Neurosurgery Referral - In Network   RTC 6 months  Pt should go ER for any new or worsening symptoms and contact office for above tests' results, any possible side effects from medication or other concerns.  A total of 40 minutes were spent on patient care,  more than 50% was spent counseling patient/family regarding studies' results, assessment, treatment options and care plan, 10 minutes were spent in (pre- and/or during visit) reviewing clinical data including imaging, labs and progress notes related to therapy or treatment.    Subjective:   History of Present Illness  Nory Reid is an 79 year old female with rheumatoid arthritis who presents with neck pain and numbness.    She experiences neck pain and numbness, which she attributes to her rheumatoid arthritis. The symptoms have been present for  an unspecified duration. The pain sometimes radiates to the back of her head and face, and is bilateral. She also has a known rotator cuff tear, contributing to pain that refers to her neck and face.    She has been prescribed gabapentin for nerve pain and numbness. She tried the medication a few times but did not take it consistently. The numbness improved, but the pain persisted. She is currently taking hydrocodone for pain management related to her rheumatoid arthritis and is concerned about taking gabapentin with her current medication regimen.    Two weeks ago, she experienced a fall while gardening, tripping over a stump and landing face forward, resulting in a bruise. She went to the ER, where a scan was performed, and no bleeding was found. She reports that the fall was due to a large stump.    No issues with walking.    History/Other:   REVIEW OF SYSTEMS:  A 10-point system was reviewed. Pertinent positives and negatives are noted as above     Medications - Current[1]  Allergies:  Allergies[2]  Past Medical History[3]  Past Surgical History[4]  Social History:  Social History     Tobacco Use    Smoking status: Never    Smokeless tobacco: Never   Substance Use Topics    Alcohol use: No     Family History[5]   Patient denies any pertinent family history associated with the current problem    Objective:   Neurological Examination:  /60   Pulse 63   Resp 16   Ht 60\"   Wt 139 lb (63 kg)   SpO2 93%   BMI 27.15 kg/m²   Language: normal   Speech: no dysarthria  CN: II-XII intact except diminished PP in right V1  Motor strength: 5/5 all extremities  Tone: normal  DTRs: 1+ symmetric  Coordination: Normal FTN  Sensory: symmetric   Gait: nl    Test reviewed on 5/23/2025      Agueda Trotter MD  Neurology  [facility]  5/23/2025, 10:41 AM  CC: Jack Crowley MD       [1]   Current Outpatient Medications:     gabapentin 100 MG Oral Cap, Take 1 capsule (100 mg total) by mouth 3 (three) times  daily., Disp: 90 capsule, Rfl: 3    losartan 25 MG Oral Tab, 1 tablet (25 mg total)., Disp: , Rfl:     Esomeprazole Magnesium 40 MG Oral Capsule Delayed Release, Take 1 capsule (40 mg total) by mouth daily., Disp: , Rfl:     Tafluprost, PF, 0.0015 % Ophthalmic Solution, Place 1 drop into both eyes nightly., Disp: , Rfl:     HYDROcodone-acetaminophen  MG Oral Tab, Take 1 tablet by mouth every 4 to 6 hours as needed., Disp: , Rfl:     Denosumab 60 MG/ML Subcutaneous Solution Prefilled Syringe, Inject 1 mL (60 mg total) into the skin every 6 (six) months. Historical documentation - see Epic Immunization Activity for administration details, Disp: 1 mL, Rfl: 0    SUMATRIPTAN SUCCINATE 50 MG Oral Tab, TAKE 1 ABLET BY MOUTH EVERY 2 HOURS AS NEEDED FOR MIGRAINE, USE AT ONSET AND REPEAT ONCE AFTER 2 HOURS (MAX 2 TABLETS PER 24 HOURS) , Disp: 9 tablet, Rfl: 0    Tocilizumab (ACTEMRA SC), Inject into the skin every 30 (thirty) days., Disp: , Rfl:     SYNTHROID 125 MCG OR TABS, Take 1 tablet (125 mcg total) by mouth before breakfast., Disp: , Rfl:     ALPRAZolam 0.5 MG Oral Tab, Take 1 tablet (0.5 mg total) by mouth nightly as needed for Sleep. (Patient not taking: Reported on 5/23/2025), Disp: , Rfl:   [2]   Allergies  Allergen Reactions    Compazine ANAPHYLAXIS    Nsaids HIVES    Peanuts TONGUE SWELLING    Morphine HALLUCINATION   [3]   Past Medical History:   Abdominal distention    Abdominal pain    Arthritis    Back pain    Bloating    Calculus of kidney    Constipation    Disorder of thyroid    Eye disease    Fatigue    Flatulence/gas pain/belching    Food intolerance    Frequent urination    Frequent use of laxatives    Glaucoma    Headache disorder    Heartburn    Hemorrhoids    HYPOTHYROIDISM    Indigestion    Migraines    OSTEOPOROSIS    Pain in joints    RA (rheumatoid arthritis) (HCC)    Thyroid disease    Uncomfortable fullness after meals    Visual impairment    reading glasses    Wears glasses   [4]   Past  Surgical History:  Procedure Laterality Date    Arthroplasty,elbow,total prosth repl  1990    Cataract  09/2018    Cholecystectomy  08/2010    Colonoscopy  18 years ago    Excis lumbar disk,one level      Foot/toes surgery proc unlisted  1995    Knee replacement surgery  1983,1984    Left knee, Right knee    Needle biopsy liver  Over 20 years    Other surgical history  1987    Knuckle replacement    Other surgical history  1996    Cervical fusion C-1, C-2    Total abdom hysterectomy  8 years ago   [5]   Family History  Problem Relation Age of Onset    Heart Disease Mother     Hypertension Mother     Heart Disease Father     Hypertension Father     Cancer Brother         Throat Cancer    Cancer Brother         Esophageal Cancer    Diabetes Brother     Diabetes Son

## 2025-05-23 NOTE — PATIENT INSTRUCTIONS
Refill policies:    Allow 2-3 business days for refills; controlled substances may take longer.  Contact your pharmacy at least 5 days prior to running out of medication and have them send an electronic request or submit request through the “request refill” option in your [employer] account.  Refills are not addressed on weekends; covering physicians do not authorize routine medications on weekends.  No narcotics or controlled substances are refilled after noon on Fridays or by on call physicians.  By law, narcotics must be electronically prescribed.  A 30 day supply with no refills is the maximum allowed.  If your prescription is due for a refill, you may be due for a follow up appointment.  To best provide you care, patients receiving routine medications need to be seen at least once a year.  Patients receiving narcotic/controlled substance medications need to be seen at least once every 3 months.  In the event that your preferred pharmacy does not have the requested medication in stock (e.g. Backordered), it is your responsibility to find another pharmacy that has the requested medication available.  We will gladly send a new prescription to that pharmacy at your request.    Scheduling Tests:    If your physician has ordered radiology tests such as MRI or CT scans, please contact Central Scheduling at [phone] right away to schedule the test.  Once scheduled, the [facility] Centralized Referral Team will work with your insurance carrier to obtain pre-certification or prior authorization.  Depending on your insurance carrier, approval may take 3-10 days.  It is highly recommended patients assure they have received an authorization before having a test performed.  If test is done without insurance authorization, patient may be responsible for the entire amount billed.      Precertification and Prior Authorizations:  If your physician has recommended that you have a procedure or additional testing performed the [facility]  Centralized Referral Team will contact your insurance carrier to obtain pre-certification or prior authorization.    You are strongly encouraged to contact your insurance carrier to verify that your procedure/test has been approved and is a COVERED benefit.  Although the [facility] Centralized Referral Team does its due diligence, the insurance carrier gives the disclaimer that \"Although the procedure is authorized, this does not guarantee payment.\"    Ultimately the patient is responsible for payment.   Thank you for your understanding in this matter.  Paperwork Completion:  If you require FMLA or disability paperwork for your recovery, please make sure to either drop it off or have it faxed to our office at [phone]. Be sure the form has your name and date of birth on it.  The form will be faxed to our Forms Department and they will complete it for you.  There is a 25$ fee for all forms that need to be filled out.  Please be aware there is a 10-14 day turnaround time.  You will need to sign a release of information (PHAM) form if your paperwork does not come with one.  You may call the Forms Department with any questions at [phone].  Their fax number is [phone].

## 2025-07-24 ENCOUNTER — LAB ENCOUNTER (OUTPATIENT)
Dept: LAB | Age: 80
End: 2025-07-24
Attending: INTERNAL MEDICINE
Payer: MEDICARE

## 2025-07-24 DIAGNOSIS — E78.5 HYPERLIPIDEMIA: ICD-10-CM

## 2025-07-24 DIAGNOSIS — I10 ESSENTIAL HYPERTENSION, MALIGNANT: ICD-10-CM

## 2025-07-24 DIAGNOSIS — I51.9 MYXEDEMA HEART DISEASE: Primary | ICD-10-CM

## 2025-07-24 DIAGNOSIS — E03.9 MYXEDEMA HEART DISEASE: Primary | ICD-10-CM

## 2025-07-24 DIAGNOSIS — M05.69 RHEU ARTHRITIS MULT SITE W INVOLV OF ORGANS AND SYSTEMS (HCC): ICD-10-CM

## 2025-07-24 LAB
ALBUMIN SERPL-MCNC: 4.9 G/DL (ref 3.2–4.8)
ALBUMIN/GLOB SERPL: 1.6 {RATIO} (ref 1–2)
ALP LIVER SERPL-CCNC: 95 U/L (ref 55–142)
ALT SERPL-CCNC: 17 U/L (ref 10–49)
ANION GAP SERPL CALC-SCNC: 9 MMOL/L (ref 0–18)
AST SERPL-CCNC: 23 U/L (ref ?–34)
BASOPHILS # BLD AUTO: 0.02 X10(3) UL (ref 0–0.2)
BASOPHILS NFR BLD AUTO: 0.2 %
BILIRUB SERPL-MCNC: 0.9 MG/DL (ref 0.2–1.1)
BUN BLD-MCNC: 16 MG/DL (ref 9–23)
CALCIUM BLD-MCNC: 9.8 MG/DL (ref 8.7–10.6)
CHLORIDE SERPL-SCNC: 104 MMOL/L (ref 98–112)
CHOLEST SERPL-MCNC: 305 MG/DL (ref ?–200)
CO2 SERPL-SCNC: 25 MMOL/L (ref 21–32)
CREAT BLD-MCNC: 0.9 MG/DL (ref 0.55–1.02)
EGFRCR SERPLBLD CKD-EPI 2021: 64 ML/MIN/1.73M2 (ref 60–?)
EOSINOPHIL # BLD AUTO: 0.05 X10(3) UL (ref 0–0.7)
EOSINOPHIL NFR BLD AUTO: 0.4 %
ERYTHROCYTE [DISTWIDTH] IN BLOOD BY AUTOMATED COUNT: 13.5 %
FASTING PATIENT LIPID ANSWER: YES
FASTING STATUS PATIENT QL REPORTED: YES
FOLATE SERPL-MCNC: 40.7 NG/ML (ref 5.4–?)
GLOBULIN PLAS-MCNC: 3 G/DL (ref 2–3.5)
GLUCOSE BLD-MCNC: 89 MG/DL (ref 70–99)
HCT VFR BLD AUTO: 42.9 % (ref 35–48)
HDLC SERPL-MCNC: 110 MG/DL (ref 40–59)
HGB BLD-MCNC: 14.7 G/DL (ref 12–16)
IMM GRANULOCYTES # BLD AUTO: 0.04 X10(3) UL (ref 0–1)
IMM GRANULOCYTES NFR BLD: 0.3 %
IRON SERPL-MCNC: 145 UG/DL (ref 50–170)
LDLC SERPL CALC-MCNC: 166 MG/DL (ref ?–100)
LYMPHOCYTES # BLD AUTO: 2.64 X10(3) UL (ref 1–4)
LYMPHOCYTES NFR BLD AUTO: 21.4 %
MCH RBC QN AUTO: 31.3 PG (ref 26–34)
MCHC RBC AUTO-ENTMCNC: 34.3 G/DL (ref 31–37)
MCV RBC AUTO: 91.5 FL (ref 80–100)
MONOCYTES # BLD AUTO: 0.73 X10(3) UL (ref 0.1–1)
MONOCYTES NFR BLD AUTO: 5.9 %
NEUTROPHILS # BLD AUTO: 8.86 X10 (3) UL (ref 1.5–7.7)
NEUTROPHILS # BLD AUTO: 8.86 X10(3) UL (ref 1.5–7.7)
NEUTROPHILS NFR BLD AUTO: 71.8 %
NONHDLC SERPL-MCNC: 195 MG/DL (ref ?–130)
OSMOLALITY SERPL CALC.SUM OF ELEC: 287 MOSM/KG (ref 275–295)
PLATELET # BLD AUTO: 205 10(3)UL (ref 150–450)
POTASSIUM SERPL-SCNC: 4 MMOL/L (ref 3.5–5.1)
PROT SERPL-MCNC: 7.9 G/DL (ref 5.7–8.2)
RBC # BLD AUTO: 4.69 X10(6)UL (ref 3.8–5.3)
SODIUM SERPL-SCNC: 138 MMOL/L (ref 136–145)
T4 FREE SERPL-MCNC: 2.1 NG/DL (ref 0.8–1.7)
TRIGL SERPL-MCNC: 165 MG/DL (ref 30–149)
TSI SER-ACNC: 0.38 UIU/ML (ref 0.55–4.78)
VIT B12 SERPL-MCNC: 553 PG/ML (ref 211–911)
VIT D+METAB SERPL-MCNC: 21.3 NG/ML (ref 30–100)
VLDLC SERPL CALC-MCNC: 33 MG/DL (ref 0–30)
WBC # BLD AUTO: 12.3 X10(3) UL (ref 4–11)

## 2025-07-24 PROCEDURE — 84443 ASSAY THYROID STIM HORMONE: CPT

## 2025-07-24 PROCEDURE — 82607 VITAMIN B-12: CPT

## 2025-07-24 PROCEDURE — 83540 ASSAY OF IRON: CPT

## 2025-07-24 PROCEDURE — 84439 ASSAY OF FREE THYROXINE: CPT

## 2025-07-24 PROCEDURE — 82746 ASSAY OF FOLIC ACID SERUM: CPT

## 2025-07-24 PROCEDURE — 80053 COMPREHEN METABOLIC PANEL: CPT

## 2025-07-24 PROCEDURE — 36415 COLL VENOUS BLD VENIPUNCTURE: CPT

## 2025-07-24 PROCEDURE — 85025 COMPLETE CBC W/AUTO DIFF WBC: CPT

## 2025-07-24 PROCEDURE — 82306 VITAMIN D 25 HYDROXY: CPT

## 2025-07-24 PROCEDURE — 80061 LIPID PANEL: CPT

## 2025-08-01 ENCOUNTER — HOSPITAL ENCOUNTER (EMERGENCY)
Age: 80
Discharge: HOME | End: 2025-08-01
Attending: EMERGENCY MEDICINE

## 2025-08-01 ENCOUNTER — APPOINTMENT (OUTPATIENT)
Dept: GENERAL RADIOLOGY | Age: 80
End: 2025-08-01
Attending: EMERGENCY MEDICINE

## 2025-08-01 VITALS
OXYGEN SATURATION: 100 % | DIASTOLIC BLOOD PRESSURE: 79 MMHG | HEIGHT: 60 IN | WEIGHT: 139 LBS | RESPIRATION RATE: 16 BRPM | TEMPERATURE: 99 F | HEART RATE: 74 BPM | BODY MASS INDEX: 27.29 KG/M2 | SYSTOLIC BLOOD PRESSURE: 128 MMHG

## 2025-08-01 DIAGNOSIS — R07.9 ACUTE CHEST PAIN: ICD-10-CM

## 2025-08-01 DIAGNOSIS — R00.2 PALPITATIONS: Primary | ICD-10-CM

## 2025-08-01 LAB
ALBUMIN SERPL-MCNC: 4.5 G/DL (ref 3.2–4.8)
ALBUMIN/GLOB SERPL: 1.9 (ref 1–2)
ALP LIVER SERPL-CCNC: 101 U/L (ref 55–142)
ALT SERPL-CCNC: 21 U/L (ref 10–49)
ANION GAP SERPL CALC-SCNC: 7 MMOL/L (ref 0–18)
AST SERPL-CCNC: 31 U/L (ref ?–34)
ATRIAL RATE: 88 BPM
BASOPHILS # BLD AUTO: 0.04 X10(3) UL (ref 0–0.2)
BASOPHILS NFR BLD AUTO: 0.6 %
BILIRUB SERPL-MCNC: 0.5 MG/DL (ref 0.2–1.1)
BUN BLD-MCNC: 18 MG/DL (ref 9–23)
CALCIUM BLD-MCNC: 9.4 MG/DL (ref 8.7–10.6)
CHLORIDE SERPL-SCNC: 101 MMOL/L (ref 98–112)
CO2 SERPL-SCNC: 31 MMOL/L (ref 21–32)
CREAT BLD-MCNC: 0.93 MG/DL (ref 0.55–1.02)
EGFRCR SERPLBLD CKD-EPI 2021: 62 ML/MIN/1.73M2 (ref 60–?)
EOSINOPHIL # BLD AUTO: 0.21 X10(3) UL (ref 0–0.7)
EOSINOPHIL NFR BLD AUTO: 3.1 %
ERYTHROCYTE [DISTWIDTH] IN BLOOD BY AUTOMATED COUNT: 13.3 %
GLOBULIN PLAS-MCNC: 2.4 G/DL (ref 2–3.5)
GLUCOSE BLD-MCNC: 110 MG/DL (ref 70–99)
HCT VFR BLD AUTO: 40.8 % (ref 35–48)
HGB BLD-MCNC: 13.8 G/DL (ref 12–16)
IMM GRANULOCYTES # BLD AUTO: 0.01 X10(3) UL (ref 0–1)
IMM GRANULOCYTES NFR BLD: 0.1 %
LYMPHOCYTES # BLD AUTO: 3.03 X10(3) UL (ref 1–4)
LYMPHOCYTES NFR BLD AUTO: 44.1 %
MAGNESIUM SERPL-MCNC: 2 MG/DL (ref 1.6–2.6)
MCH RBC QN AUTO: 31.8 PG (ref 26–34)
MCHC RBC AUTO-ENTMCNC: 33.8 G/DL (ref 31–37)
MCV RBC AUTO: 94 FL (ref 80–100)
MONOCYTES # BLD AUTO: 0.55 X10(3) UL (ref 0.1–1)
MONOCYTES NFR BLD AUTO: 8 %
NEUTROPHILS # BLD AUTO: 3.03 X10 (3) UL (ref 1.5–7.7)
NEUTROPHILS # BLD AUTO: 3.03 X10(3) UL (ref 1.5–7.7)
NEUTROPHILS NFR BLD AUTO: 44.1 %
OSMOLALITY SERPL CALC.SUM OF ELEC: 291 MOSM/KG (ref 275–295)
P AXIS: 32 DEGREES
P-R INTERVAL: 208 MS
PLATELET # BLD AUTO: 169 10(3)UL (ref 150–450)
POTASSIUM SERPL-SCNC: 4.4 MMOL/L (ref 3.5–5.1)
PROT SERPL-MCNC: 6.9 G/DL (ref 5.7–8.2)
Q-T INTERVAL: 350 MS
QRS DURATION: 74 MS
QTC CALCULATION (BEZET): 423 MS
R AXIS: -2 DEGREES
RBC # BLD AUTO: 4.34 X10(6)UL (ref 3.8–5.3)
SODIUM SERPL-SCNC: 139 MMOL/L (ref 136–145)
T AXIS: 89 DEGREES
TROPONIN I SERPL HS-MCNC: 11 NG/L (ref ?–34)
TROPONIN I SERPL HS-MCNC: 7 NG/L (ref ?–34)
TSI SER-ACNC: 2.65 UIU/ML (ref 0.55–4.78)
VENTRICULAR RATE: 88 BPM
WBC # BLD AUTO: 6.9 X10(3) UL (ref 4–11)

## 2025-08-01 PROCEDURE — 84443 ASSAY THYROID STIM HORMONE: CPT | Performed by: EMERGENCY MEDICINE

## 2025-08-01 PROCEDURE — 93010 ELECTROCARDIOGRAM REPORT: CPT

## 2025-08-01 PROCEDURE — 71045 X-RAY EXAM CHEST 1 VIEW: CPT | Performed by: EMERGENCY MEDICINE

## 2025-08-01 PROCEDURE — 93005 ELECTROCARDIOGRAM TRACING: CPT

## 2025-08-01 PROCEDURE — 80053 COMPREHEN METABOLIC PANEL: CPT | Performed by: EMERGENCY MEDICINE

## 2025-08-01 PROCEDURE — 99284 EMERGENCY DEPT VISIT MOD MDM: CPT

## 2025-08-01 PROCEDURE — 84484 ASSAY OF TROPONIN QUANT: CPT | Performed by: EMERGENCY MEDICINE

## 2025-08-01 PROCEDURE — 83735 ASSAY OF MAGNESIUM: CPT | Performed by: EMERGENCY MEDICINE

## 2025-08-01 PROCEDURE — 85025 COMPLETE CBC W/AUTO DIFF WBC: CPT | Performed by: EMERGENCY MEDICINE

## 2025-08-01 PROCEDURE — 36415 COLL VENOUS BLD VENIPUNCTURE: CPT

## 2025-08-01 PROCEDURE — 99285 EMERGENCY DEPT VISIT HI MDM: CPT

## (undated) DEVICE — STERIS KITS

## (undated) DEVICE — 10FT COMBINED O2 DELIVERY/CO2 MONITORING. FILTER WITH MICROSTREAM TYPE LUER: Brand: DUAL ADULT NASAL CANNULA

## (undated) DEVICE — BIOGUARD CLEANING ADAPTER

## (undated) DEVICE — 1200CC GUARDIAN II: Brand: GUARDIAN

## (undated) DEVICE — 3M™ RED DOT™ MONITORING ELECTRODE WITH FOAM TAPE AND STICKY GEL, 50/BAG, 20/CASE, 72/PLT 2570: Brand: RED DOT™

## (undated) DEVICE — BLOCK BITE MAXI 60FR

## (undated) DEVICE — VALVE KIT SGL USE DEFENDO

## (undated) DEVICE — GIJAW SINGLE-USE BIOPSY FORCEPS WITH NEEDLE: Brand: GIJAW

## (undated) NOTE — ED AVS SNAPSHOT
Kalyan Humphrey   MRN: [id number]    Department:  [facility] Emergency Department in [place]   Date of Visit:  1/26/2020           Disclosure     Insurance plans vary and the physician(s) referred by the ER may not be covered by your plan.  Please contact tell this physician (or your personal doctor if your instructions are to return to your personal doctor) about any new or lasting problems. The primary care or specialist physician will see patients referred from the [facility] Emergency Department.  Severo Pastures

## (undated) NOTE — ED AVS SNAPSHOT
Tanisha Esteves   MRN: [id number]    Department:  [facility] in [place]   Date of Visit:  10/27/2018           Disclosure     Insurance plans vary and the physician(s) referred by the ER may not be covered by your plan.  Please contact tell this physician (or your personal doctor if your instructions are to return to your personal doctor) about any new or lasting problems. The primary care or specialist physician will see patients referred from the [facility] Emergency Department.  Marques Isaacs

## (undated) NOTE — ED AVS SNAPSHOT
Nesha Sexton   MRN: [id number]    Department:  [facility] Emergency Department in [place]   Date of Visit:  1/24/2020           Disclosure     Insurance plans vary and the physician(s) referred by the ER may not be covered by your plan.  Please contact tell this physician (or your personal doctor if your instructions are to return to your personal doctor) about any new or lasting problems. The primary care or specialist physician will see patients referred from the [facility] Emergency Department.  Joelle Massey

## (undated) NOTE — ED AVS SNAPSHOT
Kalyan Humphrey   MRN: [id number]    Department:  [facility] Emergency Department in [place]   Date of Visit:  1/30/2020           Disclosure     Insurance plans vary and the physician(s) referred by the ER may not be covered by your plan.  Please contact tell this physician (or your personal doctor if your instructions are to return to your personal doctor) about any new or lasting problems. The primary care or specialist physician will see patients referred from the [facility] Emergency Department.  [place]